# Patient Record
Sex: FEMALE | Race: WHITE | Employment: OTHER | URBAN - METROPOLITAN AREA
[De-identification: names, ages, dates, MRNs, and addresses within clinical notes are randomized per-mention and may not be internally consistent; named-entity substitution may affect disease eponyms.]

---

## 2017-01-31 ENCOUNTER — TRANSFERRED RECORDS (OUTPATIENT)
Dept: HEALTH INFORMATION MANAGEMENT | Facility: CLINIC | Age: 67
End: 2017-01-31

## 2017-02-03 DIAGNOSIS — K90.9 MALABSORPTION: ICD-10-CM

## 2017-02-03 DIAGNOSIS — E61.1 IRON DEFICIENCY: Primary | ICD-10-CM

## 2017-02-06 ENCOUNTER — INFUSION THERAPY VISIT (OUTPATIENT)
Dept: INFUSION THERAPY | Facility: CLINIC | Age: 67
End: 2017-02-06
Attending: INTERNAL MEDICINE
Payer: MEDICARE

## 2017-02-06 VITALS
RESPIRATION RATE: 16 BRPM | HEART RATE: 69 BPM | SYSTOLIC BLOOD PRESSURE: 125 MMHG | DIASTOLIC BLOOD PRESSURE: 67 MMHG | TEMPERATURE: 98.8 F

## 2017-02-06 DIAGNOSIS — E61.1 IRON DEFICIENCY: ICD-10-CM

## 2017-02-06 DIAGNOSIS — D50.0 IRON DEFICIENCY ANEMIA SECONDARY TO BLOOD LOSS (CHRONIC): Primary | ICD-10-CM

## 2017-02-06 DIAGNOSIS — K90.9 MALABSORPTION: ICD-10-CM

## 2017-02-06 PROCEDURE — 96365 THER/PROPH/DIAG IV INF INIT: CPT

## 2017-02-06 PROCEDURE — 25000128 H RX IP 250 OP 636: Performed by: INTERNAL MEDICINE

## 2017-02-06 RX ADMIN — FERRIC CARBOXYMALTOSE INJECTION 750 MG: 50 INJECTION, SOLUTION INTRAVENOUS at 09:51

## 2017-02-06 RX ADMIN — SODIUM CHLORIDE 250 ML: 9 INJECTION, SOLUTION INTRAVENOUS at 09:51

## 2017-02-06 NOTE — PROGRESS NOTES
Infusion Nursing Note:  Jacklyn RATLIFF Flako presents today for Injectafer.    Patient seen by provider today: No.   present during visit today: Not Applicable.    Note: N/A.    Intravenous Access:  Peripheral IV placed.    Treatment Conditions:  Not Applicable.      Post Infusion Assessment:  Patient tolerated infusion without incident.  Patient observed for 30 minutes post Injectafer per protocol.  Blood return noted pre and post infusion.  Site patent and intact, free from redness, edema or discomfort.  No evidence of extravasations.  Access discontinued per protocol.    Discharge Plan:   Discharge instructions reviewed with: Patient.  Patient and/or family verbalized understanding of discharge instructions and all questions answered.  Copy of AVS reviewed with patient and/or family.  Patient will return 2/14/17 for next appointment.  Patient discharged in stable condition accompanied by: self.  Departure Mode: Ambulatory.    Venkat De RN

## 2017-02-06 NOTE — MR AVS SNAPSHOT
"              After Visit Summary   2/6/2017    Jacklyn Arriola    MRN: [id number]           Patient Information     Date Of Birth          1950        Visit Information        Provider Department      2/6/2017 9:30 AM NORTH CHAIR 10 [facility]        Today's Diagnoses     Iron deficiency anemia secondary to blood loss (chronic)    -  1     Iron deficiency         Malabsorption            Follow-ups after your visit        Your next 10 appointments already scheduled     Feb 14, 2017 10:30 AM   Level 2 with NORTH CHAIR 4   [facility] ([facility])    [facility]  [address]  [facility] [id number] [phone]              Who to contact     If you have questions or need follow up information about today's clinic visit or your schedule please contact [facility] AND [facility] directly at [phone].  Normal or non-critical lab and imaging results will be communicated to you by Prismic Pharmaceuticalshart, letter or phone within 4 business days after the clinic has received the results. If you do not hear from us within 7 days, please contact the clinic through Pr[employer] Pharm[employer]shart or phone. If you have a critical or abnormal lab result, we will notify you by phone as soon as possible.  Submit refill requests through Moodswiing or call your pharmacy and they will forward the refill request to us. Please allow 3 business days for your refill to be completed.          Additional Information About Your Visit        MyChart Information     Moodswiing lets you send messages to your doctor, view your test results, renew your prescriptions, schedule appointments and more. To sign up, go to [email or web address] . Click on \"Log in\" on the left side of the screen, which will take you to the Welcome page. Then click on \"Sign up Now\" on the right side of the page.     You will be asked to enter the access code listed " below, as well as some personal information. Please follow the directions to create your username and password.     Your access code is: [email or web address]  Expires: 2017 10:55 AM     Your access code will  in 90 days. If you need help or a new code, please call your [facility] or [phone].        Care EveryWhere ID     This is your Care EveryWhere ID. This could be used by other organizations to access your [place] medical records  [id number]        Your Vitals Were     Pulse Temperature Respirations             69 98.8  F (37.1  C) (Oral) 16          Blood Pressure from Last 3 Encounters:   17 125/67   10/06/16 118/65   16 121/76    Weight from Last 3 Encounters:   16 88.451 kg (195 lb)   16 88.451 kg (195 lb)              We Performed the Following     Treatment Conditions        Primary Care Provider    None Specified       No primary provider on file.        Thank you!     Thank you for choosing [facility] AND [facility]  for your care. Our goal is always to provide you with excellent care. Hearing back from our patients is one way we can continue to improve our services. Please take a few minutes to complete the written survey that you may receive in the mail after your visit with us. Thank you!             Your Updated Medication List - Protect others around you: Learn how to safely use, store and throw away your medicines at www.disposemymeds.org.          This list is accurate as of: 17 10:55 AM.  Always use your most recent med list.                   Brand Name Dispense Instructions for use    adalimumab 40 MG/0.8ML prefilled syringe kit    HUMIRA     Inject 40 mg Subcutaneous every 14 days       MERCAPTOPURINE PO      Take 50 mg by mouth daily       polyethylene glycol powder    MIRALAX/GLYCOLAX     Take 1 capful by mouth as needed for constipation       PRINZIDE 20-12.5 MG per tablet   Generic drug:  lisinopril-hydrochlorothiazide      2  tablets daily       TOPROL  MG 24 hr tablet   Generic drug:  metoprolol      1 TABLET DAILY

## 2017-02-14 ENCOUNTER — INFUSION THERAPY VISIT (OUTPATIENT)
Dept: INFUSION THERAPY | Facility: CLINIC | Age: 67
End: 2017-02-14
Attending: INTERNAL MEDICINE
Payer: MEDICARE

## 2017-02-14 VITALS
DIASTOLIC BLOOD PRESSURE: 60 MMHG | TEMPERATURE: 97.9 F | RESPIRATION RATE: 16 BRPM | HEART RATE: 66 BPM | SYSTOLIC BLOOD PRESSURE: 112 MMHG

## 2017-02-14 DIAGNOSIS — D50.0 IRON DEFICIENCY ANEMIA SECONDARY TO BLOOD LOSS (CHRONIC): ICD-10-CM

## 2017-02-14 DIAGNOSIS — K90.9 MALABSORPTION: ICD-10-CM

## 2017-02-14 DIAGNOSIS — E61.1 IRON DEFICIENCY: Primary | ICD-10-CM

## 2017-02-14 PROCEDURE — 96361 HYDRATE IV INFUSION ADD-ON: CPT

## 2017-02-14 PROCEDURE — 96365 THER/PROPH/DIAG IV INF INIT: CPT

## 2017-02-14 PROCEDURE — 25000128 H RX IP 250 OP 636: Performed by: INTERNAL MEDICINE

## 2017-02-14 RX ADMIN — FERRIC CARBOXYMALTOSE INJECTION 750 MG: 50 INJECTION, SOLUTION INTRAVENOUS at 10:38

## 2017-02-14 RX ADMIN — SODIUM CHLORIDE 250 ML: 9 INJECTION, SOLUTION INTRAVENOUS at 10:37

## 2017-02-14 NOTE — PROGRESS NOTES
Infusion Nursing Note:  Jacklyn RATLIFF Flako presents today for injectafer #2.    Patient seen by provider today: No   present during visit today: Not Applicable.    Note: N/A.    Intravenous Access:  Peripheral IV placed.    Treatment Conditions:  Not Applicable.      Post Infusion Assessment:  Patient tolerated infusion without incident.  Patient observed for 30 minutes post injectafer per protocol.  Site patent and intact, free from redness, edema or discomfort.  No evidence of extravasations.  Access discontinued per protocol.    Discharge Plan:   AVS to patient via MYCHART.  Patient will return prn for next appointment.   Patient discharged in stable condition accompanied by: self.  Departure Mode: Ambulatory.    Carlos Fulton RN

## 2017-02-14 NOTE — MR AVS SNAPSHOT
"              After Visit Summary   2017    Jacklyn Arriola    MRN: [id number]           Patient Information     Date Of Birth          1950        Visit Information        Provider Department      2017 10:30 AM NORTH CHAIR 4 [facility]        Today's Diagnoses     Iron deficiency    -  1    Malabsorption        Iron deficiency anemia secondary to blood loss (chronic)           Follow-ups after your visit        Who to contact     If you have questions or need follow up information about today's clinic visit or your schedule please contact [facility] AND [facility] directly at [phone].  Normal or non-critical lab and imaging results will be communicated to you by Micronoteshart, letter or phone within 4 business days after the clinic has received the results. If you do not hear from us within 7 days, please contact the clinic through Ver[employer] or phone. If you have a critical or abnormal lab result, we will notify you by phone as soon as possible.  Submit refill requests through [employer] or call your pharmacy and they will forward the refill request to us. Please allow 3 business days for your refill to be completed.          Additional Information About Your Visit        MyChart Information     [employer] lets you send messages to your doctor, view your test results, renew your prescriptions, schedule appointments and more. To sign up, go to [email or web address] . Click on \"Log in\" on the left side of the screen, which will take you to the Welcome page. Then click on \"Sign up Now\" on the right side of the page.     You will be asked to enter the access code listed below, as well as some personal information. Please follow the directions to create your username and password.     Your access code is: [email or web address]  Expires: 2017 10:55 AM     Your access code will  in 90 days. If you need help or a new code, please call your [place] clinic or " [phone].        Care EveryWhere ID     This is your Care EveryWhere ID. This could be used by other organizations to access your [place] medical records  [id number]        Your Vitals Were     Pulse Temperature Respirations             66 97.9  F (36.6  C) (Oral) 16          Blood Pressure from Last 3 Encounters:   02/14/17 112/60   02/06/17 125/67   10/06/16 118/65    Weight from Last 3 Encounters:   09/27/16 88.5 kg (195 lb)   04/11/16 88.5 kg (195 lb)              We Performed the Following     Treatment Conditions        Primary Care Provider    None Specified       No primary provider on file.        Thank you!     Thank you for choosing [facility] AND [facility]  for your care. Our goal is always to provide you with excellent care. Hearing back from our patients is one way we can continue to improve our services. Please take a few minutes to complete the written survey that you may receive in the mail after your visit with us. Thank you!             Your Updated Medication List - Protect others around you: Learn how to safely use, store and throw away your medicines at www.disposemymeds.org.          This list is accurate as of: 2/14/17 11:33 AM.  Always use your most recent med list.                   Brand Name Dispense Instructions for use    adalimumab 40 MG/0.8ML prefilled syringe kit    HUMIRA     Inject 40 mg Subcutaneous every 14 days       MERCAPTOPURINE PO      Take 50 mg by mouth daily       polyethylene glycol powder    MIRALAX/GLYCOLAX     Take 1 capful by mouth as needed for constipation       PRINZIDE 20-12.5 MG per tablet   Generic drug:  lisinopril-hydrochlorothiazide      2 tablets daily       TOPROL  MG 24 hr tablet   Generic drug:  metoprolol      1 TABLET DAILY

## 2017-04-03 DIAGNOSIS — D50.9 IRON DEFICIENCY ANEMIA, UNSPECIFIED: Primary | ICD-10-CM

## 2017-04-06 ENCOUNTER — INFUSION THERAPY VISIT (OUTPATIENT)
Dept: INFUSION THERAPY | Facility: CLINIC | Age: 67
End: 2017-04-06
Attending: INTERNAL MEDICINE
Payer: MEDICARE

## 2017-04-06 VITALS
TEMPERATURE: 98.1 F | RESPIRATION RATE: 18 BRPM | HEART RATE: 78 BPM | SYSTOLIC BLOOD PRESSURE: 149 MMHG | DIASTOLIC BLOOD PRESSURE: 70 MMHG

## 2017-04-06 DIAGNOSIS — D50.9 IRON DEFICIENCY ANEMIA, UNSPECIFIED: ICD-10-CM

## 2017-04-06 DIAGNOSIS — K50.00 CROHN'S DISEASE OF SMALL INTESTINE WITHOUT COMPLICATION (H): ICD-10-CM

## 2017-04-06 DIAGNOSIS — K90.9 MALABSORPTION: Primary | ICD-10-CM

## 2017-04-06 PROCEDURE — 96361 HYDRATE IV INFUSION ADD-ON: CPT

## 2017-04-06 PROCEDURE — 96374 THER/PROPH/DIAG INJ IV PUSH: CPT

## 2017-04-06 PROCEDURE — 25000128 H RX IP 250 OP 636: Performed by: INTERNAL MEDICINE

## 2017-04-06 RX ADMIN — SODIUM CHLORIDE 250 ML: 9 INJECTION, SOLUTION INTRAVENOUS at 09:12

## 2017-04-06 RX ADMIN — FERRIC CARBOXYMALTOSE INJECTION 750 MG: 50 INJECTION, SOLUTION INTRAVENOUS at 09:11

## 2017-04-06 ASSESSMENT — PAIN SCALES - GENERAL: PAINLEVEL: NO PAIN (0)

## 2017-04-06 NOTE — MR AVS SNAPSHOT
"              After Visit Summary   4/6/2017    Jacklyn Arriola    MRN: [id number]           Patient Information     Date Of Birth          1950        Visit Information        Provider Department      4/6/2017 9:00 AM  INFUSION CHAIR 12 [facility]        Today's Diagnoses     Malabsorption    -  1    Iron deficiency anemia, unspecified        Crohn's disease of small intestine without complication (H)           Follow-ups after your visit        Your next 10 appointments already scheduled     Apr 14, 2017  9:00 AM CDT   Level 2 with  INFUSION CHAIR 6   [facility] ([facility])    [facility] [place]  [address]  [facility] [id number] [phone]              Who to contact     If you have questions or need follow up information about today's clinic visit or your schedule please contact [facility] AND [facility] directly at [phone].  Normal or non-critical lab and imaging results will be communicated to you by INMANhart, letter or phone within 4 business days after the clinic has received the results. If you do not hear from us within 7 days, please contact the clinic through INMANhart or phone. If you have a critical or abnormal lab result, we will notify you by phone as soon as possible.  Submit refill requests through Skylines or call your pharmacy and they will forward the refill request to us. Please allow 3 business days for your refill to be completed.          Additional Information About Your Visit        INMANhart Information     Skylines lets you send messages to your doctor, view your test results, renew your prescriptions, schedule appointments and more. To sign up, go to [email or web address] . Click on \"Log in\" on the left side of the screen, which will take you to the Welcome page. Then click on \"Sign up Now\" on the right side of the page.     You will be asked " to enter the access code listed below, as well as some personal information. Please follow the directions to create your username and password.     Your access code is: [email or web address]  Expires: 2017 11:55 AM     Your access code will  in 90 days. If you need help or a new code, please call your [place] clinic or [phone].        Care EveryWhere ID     This is your Care EveryWhere ID. This could be used by other organizations to access your [place] medical records  [id number]        Your Vitals Were     Pulse Temperature Respirations             78 98.1  F (36.7  C) (Oral) 18          Blood Pressure from Last 3 Encounters:   17 149/70   17 112/60   17 125/67    Weight from Last 3 Encounters:   16 88.5 kg (195 lb)   16 88.5 kg (195 lb)              We Performed the Following     Treatment Conditions        Primary Care Provider    None Specified       No primary provider on file.        Thank you!     Thank you for choosing [facility]  for your care. Our goal is always to provide you with excellent care. Hearing back from our patients is one way we can continue to improve our services. Please take a few minutes to complete the written survey that you may receive in the mail after your visit with us. Thank you!             Your Updated Medication List - Protect others around you: Learn how to safely use, store and throw away your medicines at www.disposemymeds.org.          This list is accurate as of: 17 10:13 AM.  Always use your most recent med list.                   Brand Name Dispense Instructions for use    adalimumab 40 MG/0.8ML prefilled syringe kit    HUMIRA     Inject 40 mg Subcutaneous every 14 days       MERCAPTOPURINE PO      Take 50 mg by mouth daily       polyethylene glycol powder    MIRALAX/GLYCOLAX     Take 1 capful by mouth as needed for constipation       PRINZIDE 20-12.5 MG per tablet   Generic drug:   lisinopril-hydrochlorothiazide      2 tablets daily       TOPROL  MG 24 hr tablet   Generic drug:  metoprolol      1 TABLET DAILY

## 2017-04-06 NOTE — PROGRESS NOTES
Infusion Nursing Note:  Jacklyn GAUDENCIO Arriola presents today for Injectafer.    Patient seen by provider today: No   present during visit today: Not Applicable.    Note: N/A.    Intravenous Access:  Peripheral IV placed.    Treatment Conditions:  Not Applicable.      Post Infusion Assessment:  Patient tolerated infusion without incident.  Patient observed for 30 minutes post Injectafer per protocol.  Site patent and intact, free from redness, edema or discomfort.  No evidence of extravasations.  Access discontinued per protocol.    Discharge Plan:   Copy of AVS reviewed with patient and/or family.  Patient will return in 7 days for next appointment.  Patient discharged in stable condition accompanied by: self.  Departure Mode: Ambulatory.    Jaylene Hong RN

## 2017-04-17 ENCOUNTER — INFUSION THERAPY VISIT (OUTPATIENT)
Dept: INFUSION THERAPY | Facility: CLINIC | Age: 67
End: 2017-04-17
Attending: INTERNAL MEDICINE
Payer: MEDICARE

## 2017-04-17 VITALS
RESPIRATION RATE: 16 BRPM | TEMPERATURE: 97.6 F | DIASTOLIC BLOOD PRESSURE: 60 MMHG | SYSTOLIC BLOOD PRESSURE: 121 MMHG | HEART RATE: 67 BPM

## 2017-04-17 DIAGNOSIS — K50.00 CROHN'S DISEASE OF SMALL INTESTINE WITHOUT COMPLICATION (H): ICD-10-CM

## 2017-04-17 DIAGNOSIS — D50.9 IRON DEFICIENCY ANEMIA, UNSPECIFIED: Primary | ICD-10-CM

## 2017-04-17 PROCEDURE — 96365 THER/PROPH/DIAG IV INF INIT: CPT

## 2017-04-17 PROCEDURE — 25000128 H RX IP 250 OP 636: Performed by: INTERNAL MEDICINE

## 2017-04-17 RX ADMIN — FERRIC CARBOXYMALTOSE INJECTION 750 MG: 50 INJECTION, SOLUTION INTRAVENOUS at 13:40

## 2017-04-17 RX ADMIN — SODIUM CHLORIDE 250 ML: 9 INJECTION, SOLUTION INTRAVENOUS at 13:39

## 2017-04-17 ASSESSMENT — PAIN SCALES - GENERAL: PAINLEVEL: NO PAIN (0)

## 2017-04-17 NOTE — MR AVS SNAPSHOT
"              After Visit Summary   2017    Jacklyn Arriola    MRN: [id number]           Patient Information     Date Of Birth          1950        Visit Information        Provider Department      2017 1:30 PM  INFUSION CHAIR 7 [facility]        Today's Diagnoses     Iron deficiency anemia, unspecified    -  1    Crohn's disease of small intestine without complication (H)           Follow-ups after your visit        Who to contact     If you have questions or need follow up information about today's clinic visit or your schedule please contact [facility] directly at [phone].  Normal or non-critical lab and imaging results will be communicated to you by Xplornethart, letter or phone within 4 business days after the clinic has received the results. If you do not hear from us within 7 days, please contact the clinic through Xplornethart or phone. If you have a critical or abnormal lab result, we will notify you by phone as soon as possible.  Submit refill requests through Open-Plug or call your pharmacy and they will forward the refill request to us. Please allow 3 business days for your refill to be completed.          Additional Information About Your Visit        MyChart Information     Open-Plug lets you send messages to your doctor, view your test results, renew your prescriptions, schedule appointments and more. To sign up, go to [email or web address] . Click on \"Log in\" on the left side of the screen, which will take you to the Welcome page. Then click on \"Sign up Now\" on the right side of the page.     You will be asked to enter the access code listed below, as well as some personal information. Please follow the directions to create your username and password.     Your access code is: [email or web address]  Expires: 2017 11:55 AM     Your access code will  in 90 days. If you need help or a new code, please call your [place] " clinic or [phone].        Care EveryWhere ID     This is your Care EveryWhere ID. This could be used by other organizations to access your [place] medical records  [id number]        Your Vitals Were     Pulse Temperature Respirations             67 97.6  F (36.4  C) (Oral) 16          Blood Pressure from Last 3 Encounters:   04/17/17 121/60   04/06/17 149/70   02/14/17 112/60    Weight from Last 3 Encounters:   09/27/16 88.5 kg (195 lb)   04/11/16 88.5 kg (195 lb)              We Performed the Following     Treatment Conditions        Primary Care Provider    None Specified       No primary provider on file.        Thank you!     Thank you for choosing [facility]  for your care. Our goal is always to provide you with excellent care. Hearing back from our patients is one way we can continue to improve our services. Please take a few minutes to complete the written survey that you may receive in the mail after your visit with us. Thank you!             Your Updated Medication List - Protect others around you: Learn how to safely use, store and throw away your medicines at www.disposemymeds.org.          This list is accurate as of: 4/17/17  2:32 PM.  Always use your most recent med list.                   Brand Name Dispense Instructions for use    adalimumab 40 MG/0.8ML prefilled syringe kit    HUMIRA     Inject 40 mg Subcutaneous every 14 days       MERCAPTOPURINE PO      Take 50 mg by mouth daily       polyethylene glycol powder    MIRALAX/GLYCOLAX     Take 1 capful by mouth as needed for constipation       PRINZIDE 20-12.5 MG per tablet   Generic drug:  lisinopril-hydrochlorothiazide      2 tablets daily       TOPROL  MG 24 hr tablet   Generic drug:  metoprolol      1 TABLET DAILY

## 2017-04-17 NOTE — PROGRESS NOTES
Infusion Nursing Note:  Jacklyn RATLIFF Flako presents today for injectafer.    Patient seen by provider today: No   present during visit today: Not Applicable.    Note: N/A.    Intravenous Access:  Peripheral IV placed.    Treatment Conditions:  Not Applicable.      Post Infusion Assessment:  Patient tolerated infusion without incident.  Patient observed for 30 minutes post injectafer per protocol.  Blood return noted pre and post infusion.  Site patent and intact, free from redness, edema or discomfort.  No evidence of extravasations.  Access discontinued per protocol.    Discharge Plan:   Discharge instructions reviewed with: Patient.  Patient and/or family verbalized understanding of discharge instructions and all questions answered.  Copy of AVS reviewed with patient and/or family.  Patient will return as needed for next appointment.  Patient discharged in stable condition accompanied by: self.  Departure Mode: Ambulatory.    Stefani Clemente RN

## 2017-06-13 ENCOUNTER — TRANSFERRED RECORDS (OUTPATIENT)
Dept: HEALTH INFORMATION MANAGEMENT | Facility: CLINIC | Age: 67
End: 2017-06-13

## 2017-06-19 ENCOUNTER — INFUSION THERAPY VISIT (OUTPATIENT)
Dept: INFUSION THERAPY | Facility: CLINIC | Age: 67
End: 2017-06-19
Attending: INTERNAL MEDICINE
Payer: MEDICARE

## 2017-06-19 VITALS
SYSTOLIC BLOOD PRESSURE: 111 MMHG | HEART RATE: 74 BPM | TEMPERATURE: 98.1 F | DIASTOLIC BLOOD PRESSURE: 66 MMHG | RESPIRATION RATE: 16 BRPM

## 2017-06-19 DIAGNOSIS — K50.019 CROHN'S DISEASE OF SMALL INTESTINE WITH COMPLICATION (H): Primary | ICD-10-CM

## 2017-06-19 DIAGNOSIS — K50.019 CROHN'S DISEASE OF SMALL INTESTINE WITH COMPLICATION (H): ICD-10-CM

## 2017-06-19 DIAGNOSIS — D50.9 IRON DEFICIENCY ANEMIA, UNSPECIFIED: ICD-10-CM

## 2017-06-19 DIAGNOSIS — E61.1 IRON DEFICIENCY: ICD-10-CM

## 2017-06-19 DIAGNOSIS — K50.00 CROHN'S DISEASE OF SMALL INTESTINE WITHOUT COMPLICATION (H): ICD-10-CM

## 2017-06-19 PROCEDURE — 96365 THER/PROPH/DIAG IV INF INIT: CPT

## 2017-06-19 PROCEDURE — 25000128 H RX IP 250 OP 636: Performed by: INTERNAL MEDICINE

## 2017-06-19 RX ADMIN — SODIUM CHLORIDE 250 ML: 9 INJECTION, SOLUTION INTRAVENOUS at 14:40

## 2017-06-19 RX ADMIN — FERRIC CARBOXYMALTOSE INJECTION 750 MG: 50 INJECTION, SOLUTION INTRAVENOUS at 14:41

## 2017-06-19 ASSESSMENT — PAIN SCALES - GENERAL: PAINLEVEL: NO PAIN (0)

## 2017-06-19 NOTE — MR AVS SNAPSHOT
"              After Visit Summary   2017    Jacklyn Arriola    MRN: [id number]           Patient Information     Date Of Birth          1950        Visit Information        Provider Department      2017 2:30 PM  INFUSION CHAIR 11 [facility]        Today's Diagnoses     Crohn's disease of small intestine with complication (H)    -  1    Iron deficiency anemia, unspecified        Crohn's disease of small intestine without complication (H)           Follow-ups after your visit        Who to contact     If you have questions or need follow up information about today's clinic visit or your schedule please contact [facility] directly at [phone].  Normal or non-critical lab and imaging results will be communicated to you by immatics biotechnologieshart, letter or phone within 4 business days after the clinic has received the results. If you do not hear from us within 7 days, please contact the clinic through immatics biotechnologieshart or phone. If you have a critical or abnormal lab result, we will notify you by phone as soon as possible.  Submit refill requests through PlusFourSix or call your pharmacy and they will forward the refill request to us. Please allow 3 business days for your refill to be completed.          Additional Information About Your Visit        MyChart Information     PlusFourSix lets you send messages to your doctor, view your test results, renew your prescriptions, schedule appointments and more. To sign up, go to [email or web address] . Click on \"Log in\" on the left side of the screen, which will take you to the Welcome page. Then click on \"Sign up Now\" on the right side of the page.     You will be asked to enter the access code listed below, as well as some personal information. Please follow the directions to create your username and password.     Your access code is: [email or web address]  Expires: 2017  3:38 PM     Your access code will  in 90 " days. If you need help or a new code, please call your [place] clinic or [phone].        Care EveryWhere ID     This is your Care EveryWhere ID. This could be used by other organizations to access your [place] medical records  [id number]        Your Vitals Were     Pulse Temperature Respirations             74 98.1  F (36.7  C) (Oral) 16          Blood Pressure from Last 3 Encounters:   06/19/17 111/66   04/17/17 121/60   04/06/17 149/70    Weight from Last 3 Encounters:   09/27/16 88.5 kg (195 lb)   04/11/16 88.5 kg (195 lb)              Today, you had the following     No orders found for display       Primary Care Provider    None Specified       No primary provider on file.        Thank you!     Thank you for choosing [facility] AND [facility]  for your care. Our goal is always to provide you with excellent care. Hearing back from our patients is one way we can continue to improve our services. Please take a few minutes to complete the written survey that you may receive in the mail after your visit with us. Thank you!             Your Updated Medication List - Protect others around you: Learn how to safely use, store and throw away your medicines at www.disposemymeds.org.          This list is accurate as of: 6/19/17  3:38 PM.  Always use your most recent med list.                   Brand Name Dispense Instructions for use    adalimumab 40 MG/0.8ML prefilled syringe kit    HUMIRA     Inject 40 mg Subcutaneous every 7 days       MERCAPTOPURINE PO      Take 50 mg by mouth daily       polyethylene glycol powder    MIRALAX/GLYCOLAX     Take 1 capful by mouth as needed for constipation       PRINZIDE 20-12.5 MG per tablet   Generic drug:  lisinopril-hydrochlorothiazide      2 tablets daily       TOPROL  MG 24 hr tablet   Generic drug:  metoprolol      1 TABLET DAILY

## 2017-06-19 NOTE — PROGRESS NOTES
Infusion Nursing Note:  Jacklyn Arriola presents today for injectafer.    Patient seen by provider today: No   present during visit today: Not Applicable.    Note: Patient has had injectafer in the past and tolerated well. Reports ongoing fatigue and lightheadedness when changing positions, otherwise no new concerns.    Intravenous Access:  Peripheral IV placed.    Treatment Conditions:  Not Applicable.      Post Infusion Assessment:  Patient tolerated infusion without incident.  Patient observed for 30 minutes post injectafer per protocol.  Blood return noted pre and post infusion.  Site patent and intact, free from redness, edema or discomfort.  No evidence of extravasations.  Access discontinued per protocol.    Discharge Plan:   Discharge instructions reviewed with: Patient.  Patient and/or family verbalized understanding of discharge instructions and all questions answered.  AVS to patient via ITS ComplianceT.  Patient will return in 1 week for next appointment--will stop at  to schedule.   Patient discharged in stable condition accompanied by: self.  Departure Mode: Ambulatory.    Stefani Clemente RN

## 2017-06-27 ENCOUNTER — INFUSION THERAPY VISIT (OUTPATIENT)
Dept: INFUSION THERAPY | Facility: CLINIC | Age: 67
End: 2017-06-27
Attending: INTERNAL MEDICINE
Payer: MEDICARE

## 2017-06-27 VITALS
DIASTOLIC BLOOD PRESSURE: 76 MMHG | SYSTOLIC BLOOD PRESSURE: 115 MMHG | TEMPERATURE: 97.7 F | HEART RATE: 69 BPM | RESPIRATION RATE: 16 BRPM

## 2017-06-27 DIAGNOSIS — K50.019 CROHN'S DISEASE OF SMALL INTESTINE WITH COMPLICATION (H): Primary | ICD-10-CM

## 2017-06-27 DIAGNOSIS — D50.9 IRON DEFICIENCY ANEMIA, UNSPECIFIED: ICD-10-CM

## 2017-06-27 DIAGNOSIS — K90.9 MALABSORPTION: ICD-10-CM

## 2017-06-27 DIAGNOSIS — K50.00 CROHN'S DISEASE OF SMALL INTESTINE WITHOUT COMPLICATION (H): ICD-10-CM

## 2017-06-27 PROCEDURE — 25000128 H RX IP 250 OP 636: Performed by: INTERNAL MEDICINE

## 2017-06-27 PROCEDURE — 96365 THER/PROPH/DIAG IV INF INIT: CPT

## 2017-06-27 RX ADMIN — FERRIC CARBOXYMALTOSE INJECTION 750 MG: 50 INJECTION, SOLUTION INTRAVENOUS at 10:12

## 2017-06-27 RX ADMIN — SODIUM CHLORIDE 250 ML: 9 INJECTION, SOLUTION INTRAVENOUS at 10:12

## 2017-06-27 ASSESSMENT — PAIN SCALES - GENERAL: PAINLEVEL: NO PAIN (0)

## 2017-06-27 NOTE — MR AVS SNAPSHOT
"              After Visit Summary   6/27/2017    Jacklyn Arriola    MRN: [id number]           Patient Information     Date Of Birth          1950        Visit Information        Provider Department      6/27/2017 10:00 AM  INFUSION CHAIR 18 [facility]        Today's Diagnoses     Crohn's disease of small intestine with complication (H)    -  1    Malabsorption        Iron deficiency anemia, unspecified        Crohn's disease of small intestine without complication (H)           Follow-ups after your visit        Who to contact     If you have questions or need follow up information about today's clinic visit or your schedule please contact [facility] AND [facility] directly at [phone].  Normal or non-critical lab and imaging results will be communicated to you by PassionTaghart, letter or phone within 4 business days after the clinic has received the results. If you do not hear from us within 7 days, please contact the clinic through PassionTaghart or phone. If you have a critical or abnormal lab result, we will notify you by phone as soon as possible.  Submit refill requests through Operative Mind or call your pharmacy and they will forward the refill request to us. Please allow 3 business days for your refill to be completed.          Additional Information About Your Visit        MyChart Information     Operative Mind lets you send messages to your doctor, view your test results, renew your prescriptions, schedule appointments and more. To sign up, go to [email or web address] . Click on \"Log in\" on the left side of the screen, which will take you to the Welcome page. Then click on \"Sign up Now\" on the right side of the page.     You will be asked to enter the access code listed below, as well as some personal information. Please follow the directions to create your username and password.     Your access code is: [email or web address]  Expires: 9/17/2017  3:38 PM     Your access " code will  in 90 days. If you need help or a new code, please call your [facility] clinic or [phone].        Care EveryWhere ID     This is your Care EveryWhere ID. This could be used by other organizations to access your [facility] medical records  [id number]        Your Vitals Were     Pulse Temperature Respirations Breastfeeding?          69 97.7  F (36.5  C) (Oral) 16 No         Blood Pressure from Last 3 Encounters:   17 115/76   17 111/66   17 121/60    Weight from Last 3 Encounters:   16 88.5 kg (195 lb)   16 88.5 kg (195 lb)              Today, you had the following     No orders found for display       Primary Care Provider    None Specified       No primary provider on file.        Equal Access to Services     GAYE VALERO : Shy Kraft, wafiona luqadaha, qaybta kaalmasam law, marjorie wu . So [facility] [phone].    ATENCIÓN: Si habla español, tiene a foster disposición servicios gratuitos de asistencia lingüística. Llame al [phone].    We comply with applicable federal civil rights laws and [place] laws. We do not discriminate on the basis of race, color, national origin, age, disability sex, sexual orientation or gender identity.            Thank you!     Thank you for choosing [facility] AND [facility]  for your care. Our goal is always to provide you with excellent care. Hearing back from our patients is one way we can continue to improve our services. Please take a few minutes to complete the written survey that you may receive in the mail after your visit with us. Thank you!             Your Updated Medication List - Protect others around you: Learn how to safely use, store and throw away your medicines at www.disposemymeds.org.          This list is accurate as of: 17  2:20 PM.  Always use your most recent med list.                   Brand Name Dispense Instructions for use Diagnosis     adalimumab 40 MG/0.8ML prefilled syringe kit    HUMIRA     Inject 40 mg Subcutaneous every 7 days        MERCAPTOPURINE PO      Take 50 mg by mouth daily        polyethylene glycol powder    MIRALAX/GLYCOLAX     Take 1 capful by mouth as needed for constipation        PRINZIDE 20-12.5 MG per tablet   Generic drug:  lisinopril-hydrochlorothiazide      2 tablets daily        TOPROL  MG 24 hr tablet   Generic drug:  metoprolol      1 TABLET DAILY

## 2017-06-27 NOTE — PROGRESS NOTES
Infusion Nursing Note:  Jacklyn RATLIFF Flako presents today for injectafer.    Patient seen by provider today: No   present during visit today: Not Applicable.    Note: Seeing Dr. Juarez 7/6 for hematology workup as Crohns disease is in remission and is not causing her decreased iron levels, per patient.    Intravenous Access:  Peripheral IV placed.    Treatment Conditions:  Not Applicable.      Post Infusion Assessment:  Patient tolerated infusion without incident.  Patient observed for 30 minutes post injectafer per protocol.  Site patent and intact, free from redness, edema or discomfort.  No evidence of extravasations.  Access discontinued per protocol.    Discharge Plan:   AVS to patient via MYCHART.  Patient will return prn for next appointment.   Patient discharged in stable condition accompanied by: self.  Departure Mode: Ambulatory.    Carlos Fulton RN

## 2018-03-07 ENCOUNTER — HOSPITAL ENCOUNTER (EMERGENCY)
Facility: CLINIC | Age: 68
Discharge: HOME | End: 2018-03-08
Attending: EMERGENCY MEDICINE | Admitting: EMERGENCY MEDICINE
Payer: MEDICARE

## 2018-03-07 VITALS
RESPIRATION RATE: 18 BRPM | OXYGEN SATURATION: 97 % | TEMPERATURE: 98 F | DIASTOLIC BLOOD PRESSURE: 77 MMHG | HEIGHT: 65 IN | BODY MASS INDEX: 34.16 KG/M2 | WEIGHT: 205 LBS | SYSTOLIC BLOOD PRESSURE: 131 MMHG

## 2018-03-07 DIAGNOSIS — G43.909 MIGRAINE WITHOUT STATUS MIGRAINOSUS, NOT INTRACTABLE, UNSPECIFIED MIGRAINE TYPE: ICD-10-CM

## 2018-03-07 PROCEDURE — 96376 TX/PRO/DX INJ SAME DRUG ADON: CPT

## 2018-03-07 PROCEDURE — 25000128 H RX IP 250 OP 636: Performed by: EMERGENCY MEDICINE

## 2018-03-07 PROCEDURE — 96374 THER/PROPH/DIAG INJ IV PUSH: CPT

## 2018-03-07 PROCEDURE — 96375 TX/PRO/DX INJ NEW DRUG ADDON: CPT

## 2018-03-07 PROCEDURE — 96361 HYDRATE IV INFUSION ADD-ON: CPT

## 2018-03-07 PROCEDURE — 99285 EMERGENCY DEPT VISIT HI MDM: CPT | Mod: 25

## 2018-03-07 PROCEDURE — 25000128 H RX IP 250 OP 636

## 2018-03-07 RX ORDER — LORAZEPAM 2 MG/ML
INJECTION INTRAMUSCULAR
Status: DISCONTINUED
Start: 2018-03-07 | End: 2018-03-08 | Stop reason: HOSPADM

## 2018-03-07 RX ORDER — KETOROLAC TROMETHAMINE 15 MG/ML
15 INJECTION, SOLUTION INTRAMUSCULAR; INTRAVENOUS ONCE
Status: COMPLETED | OUTPATIENT
Start: 2018-03-07 | End: 2018-03-07

## 2018-03-07 RX ORDER — BUTALBITAL, ACETAMINOPHEN AND CAFFEINE 50; 325; 40 MG/1; MG/1; MG/1
1 TABLET ORAL EVERY 4 HOURS PRN
Qty: 28 TABLET | Refills: 0 | Status: ON HOLD | OUTPATIENT
Start: 2018-03-07 | End: 2021-05-26

## 2018-03-07 RX ORDER — DIPHENHYDRAMINE HYDROCHLORIDE 50 MG/ML
25 INJECTION INTRAMUSCULAR; INTRAVENOUS ONCE
Status: COMPLETED | OUTPATIENT
Start: 2018-03-07 | End: 2018-03-07

## 2018-03-07 RX ORDER — DIPHENHYDRAMINE HYDROCHLORIDE 50 MG/ML
INJECTION INTRAMUSCULAR; INTRAVENOUS
Status: COMPLETED
Start: 2018-03-07 | End: 2018-03-07

## 2018-03-07 RX ORDER — LORAZEPAM 2 MG/ML
0.5 INJECTION INTRAMUSCULAR ONCE
Status: COMPLETED | OUTPATIENT
Start: 2018-03-07 | End: 2018-03-07

## 2018-03-07 RX ADMIN — KETOROLAC TROMETHAMINE 15 MG: 15 INJECTION, SOLUTION INTRAMUSCULAR; INTRAVENOUS at 21:16

## 2018-03-07 RX ADMIN — LORAZEPAM 0.5 MG: 2 INJECTION INTRAMUSCULAR; INTRAVENOUS at 22:02

## 2018-03-07 RX ADMIN — PROCHLORPERAZINE EDISYLATE 5 MG: 5 INJECTION INTRAMUSCULAR; INTRAVENOUS at 21:14

## 2018-03-07 RX ADMIN — DIPHENHYDRAMINE HYDROCHLORIDE 25 MG: 50 INJECTION INTRAMUSCULAR; INTRAVENOUS at 21:59

## 2018-03-07 RX ADMIN — DIPHENHYDRAMINE HYDROCHLORIDE 25 MG: 50 INJECTION, SOLUTION INTRAMUSCULAR; INTRAVENOUS at 21:21

## 2018-03-07 RX ADMIN — DIPHENHYDRAMINE HYDROCHLORIDE 25 MG: 50 INJECTION, SOLUTION INTRAMUSCULAR; INTRAVENOUS at 21:59

## 2018-03-07 RX ADMIN — SODIUM CHLORIDE 1000 ML: 9 INJECTION, SOLUTION INTRAVENOUS at 21:13

## 2018-03-07 ASSESSMENT — ENCOUNTER SYMPTOMS
FEVER: 0
CHILLS: 0
HEADACHES: 1
NUMBNESS: 1

## 2018-03-07 NOTE — ED AVS SNAPSHOT
Emergency Department    [address] [place] [address]    Phone:  [phone]    Fax:  [phone]                                       Jacklyn Arriola   MRN: [id number]    Department:   Emergency Department   Date of Visit:  3/7/2018           Patient Information     Date Of Birth          1950        Your diagnoses for this visit were:     Migraine without status migrainosus, not intractable, unspecified migraine type        You were seen by Daniel Martínez MD.      Follow-up Information     Follow up with [facility] [address].    Contact information:    [address] [place] [address]          Discharge Instructions       Discharge Instructions  Headache    You were seen today for a headache. Headaches may be caused by many different things such as muscle tension, sinus inflammation, anxiety and stress, having too little sleep, too much alcohol, some medical conditions or injury. You may have a migraine, which is caused by changes in the blood vessels in your head.  At this time your doctor does not find that your headache is a sign of anything dangerous or life-threatening.  However, sometimes the signs of serious illness do not show up right away.  If you have new or worse symptoms, you may need to be seen again in the emergency department or by your primary doctor.      Return to the Emergency Department if:    You get a fever of 101 F or higher.    Your headache gets much worse.    You get a stiff neck with your headache.    You get a new headache that is different or worse than headaches you have had before.    You are vomiting and can t keep food or water down.    You have blurry or double vision or other problems with your eyes.    You have a new weakness on one side of your body.    You have difficulty with balance which is new.    You or your family thinks you are confused.    You have a seizure or convulsion.    What can I do to help myself?    Pain  medications - You may take a pain medication such as Tylenol  (acetaminophen), Advil , Nuprin  (ibuprofen) or Aleve  (naproxen).  If you have been given a narcotic such as Vicodin  (hydrocodone with acetaminophen), Percocet  (oxycodone with acetaminophen), codeine, or a muscle relaxant such as Flexeril  (cyclobenzaprine) or Soma  (carisoprodol), do not drive for four hours after you have taken it. If the narcotic contains Tylenol  (acetaminophen), do not take Tylenol  with it. All narcotics will cause constipation, so eat a high fiber diet.        Take a pain reliever as soon as you notice symptoms.  Starting medications as soon as you start to have symptoms may lessen the amount of pain you have.    Relaxing in a quiet, dark room may help.    Get enough sleep and eat meals regularly.    Schedule an appointment with your primary physician as instructed, or at least within 1 week.    You may need to watch for certain foods or other things which may trigger your headaches.  Keeping a journal of your headaches and possible triggers may help you and your primary doctor to identify things which you should avoid which may be causing your headaches.  If you were given a prescription for medicine here today, be sure to read all of the information (including the package insert) that comes with your prescription.  This will include important information about the medicine, its side effects, and any warnings that you need to know about.  The pharmacist who fills the prescription can provide more information and answer questions you may have about the medicine.  If you have questions or concerns that the pharmacist cannot address, please call or return to the Emergency Department.   Opioid Medication Information    Pain medications are among the most commonly prescribed medicines, so we are including this information for all our patients. If you did not receive pain medication or get a prescription for pain medicine, you can  ignore it.     You may have been given a prescription for an opioid (narcotic) pain medicine and/or have received a pain medicine while here in the Emergency Department. These medicines can make you drowsy or impaired. You must not drive, operate dangerous equipment, or engage in any other dangerous activities while taking these medications. If you drive while taking these medications, you could be arrested for DUI, or driving under the influence. Do not drink any alcohol while you are taking these medications.     Opioid pain medications can cause addiction. If you have a history of chemical dependency of any type, you are at a higher risk of becoming addicted to pain medications.  Only take these prescribed medications to treat your pain when all other options have been tried. Take it for as short a time and as few doses as possible. Store your pain pills in a secure place, as they are frequently stolen and provide a dangerous opportunity for children or visitors in your house to start abusing these powerful medications. We will not replace any lost or stolen medicine.  As soon as your pain is better, you should flush all your remaining medication.     Many prescription pain medications contain Tylenol  (acetaminophen), including Vicodin , Tylenol #3 , Norco , Lortab , and Percocet .  You should not take any extra pills of Tylenol  if you are using these prescription medications or you can get very sick.  Do not ever take more than 3000 mg of acetaminophen in any 24 hour period.    All opioids tend to cause constipation. Drink plenty of water and eat foods that have a lot of fiber, such as fruits, vegetables, prune juice, apple juice and high fiber cereal.  Take a laxative if you don t move your bowels at least every other day. Miralax , Milk of Magnesia, Colace , or Senna  can be used to keep you regular.      Remember that you can always come back to the Emergency Department if you are not able to see your  regular doctor in the amount of time listed above, if you get any new symptoms, or if there is anything that worries you.          24 Hour Appointment Hotline       To make an appointment at any [facility], call [phone] ([phone]). If you don't have a family doctor or clinic, we will help you find one. [facility] clinics are conveniently located to serve the needs of you and your family.             Review of your medicines      START taking        Dose / Directions Last dose taken    butalbital-acetaminophen-caffeine -40 MG per tablet   Commonly known as:  FIORICET/ESGIC   Dose:  1 tablet   Quantity:  28 tablet        Take 1 tablet by mouth every 4 hours as needed   Refills:  0          Our records show that you are taking the medicines listed below. If these are incorrect, please call your family doctor or clinic.        Dose / Directions Last dose taken    adalimumab 40 MG/0.8ML prefilled syringe kit   Commonly known as:  HUMIRA   Dose:  40 mg        Inject 40 mg Subcutaneous every 7 days   Refills:  0        MERCAPTOPURINE PO   Dose:  50 mg        Take 50 mg by mouth daily   Refills:  0        polyethylene glycol powder   Commonly known as:  MIRALAX/GLYCOLAX   Dose:  1 capful        Take 1 capful by mouth as needed for constipation   Refills:  0        PRINZIDE 20-12.5 MG per tablet   Generic drug:  lisinopril-hydrochlorothiazide        2 tablets daily   Refills:  0        TOPROL  MG 24 hr tablet   Generic drug:  metoprolol succinate        1 TABLET DAILY   Refills:  0                Prescriptions were sent or printed at these locations (1 Prescription)                   Other Prescriptions                Printed at Department/Unit printer (1 of 1)         butalbital-acetaminophen-caffeine (FIORICET/ESGIC) -40 MG per tablet                Orders Needing Specimen Collection     None      Pending Results     No orders found from 3/5/2018 to 3/8/2018.            Pending Culture  Results     No orders found from 3/5/2018 to 3/8/2018.            Pending Results Instructions     If you had any lab results that were not finalized at the time of your Discharge, you can call the ED Lab Result RN at [phone]. You will be contacted by this team for any positive Lab results or changes in treatment. The nurses are available 7 days a week from 10A to 6:30P.  You can leave a message 24 hours per day and they will return your call.        Test Results From Your Hospital Stay               Clinical Quality Measure: Blood Pressure Screening     Your blood pressure was checked while you were in the emergency department today. The last reading we obtained was  BP: 131/77 . Please read the guidelines below about what these numbers mean and what you should do about them.  If your systolic blood pressure (the top number) is less than 120 and your diastolic blood pressure (the bottom number) is less than 80, then your blood pressure is normal. There is nothing more that you need to do about it.  If your systolic blood pressure (the top number) is 120-139 or your diastolic blood pressure (the bottom number) is 80-89, your blood pressure may be higher than it should be. You should have your blood pressure rechecked within a year by a primary care provider.  If your systolic blood pressure (the top number) is 140 or greater or your diastolic blood pressure (the bottom number) is 90 or greater, you may have high blood pressure. High blood pressure is treatable, but if left untreated over time it can put you at risk for heart attack, stroke, or kidney failure. You should have your blood pressure rechecked by a primary care provider within the next 4 weeks.  If your provider in the emergency department today gave you specific instructions to follow-up with your doctor or provider even sooner than that, you should follow that instruction and not wait for up to 4 weeks for your follow-up visit.        Thank you for  "choosing [facility]       Thank you for choosing [facility] for your care. Our goal is always to provide you with excellent care. Hearing back from our patients is one way we can continue to improve our services. Please take a few minutes to complete the written survey that you may receive in the mail after you visit with us. Thank you!        [employer] Information     M[employer] lets you send messages to your doctor, view your test results, renew your prescriptions, schedule appointments and more. To sign up, go to [email or web address] . Click on \"Log in\" on the left side of the screen, which will take you to the Welcome page. Then click on \"Sign up Now\" on the right side of the page.     You will be asked to enter the access code listed below, as well as some personal information. Please follow the directions to create your username and password.     Your access code is: [email or web address]  Expires: 2018 10:58 PM     Your access code will  in 90 days. If you need help or a new code, please call your [facility] clinic or [phone].        Care EveryWhere ID     This is your Care EveryWhere ID. This could be used by other organizations to access your [facility] medical records  [id number]        Equal Access to Services     GAYE VALERO : Shy villegaso Swapnil, waaxda luqadaha, qaybta kaalmada adesjyada, marjorie ram. So [facility] [phone].    ATENCIÓN: Si habla español, tiene a foster disposición servicios gratuitos de asistencia lingüística. Llame al [phone].    We comply with applicable federal civil rights laws and [place] laws. We do not discriminate on the basis of race, color, national origin, age, disability, sex, sexual orientation, or gender identity.            After Visit Summary       This is your record. Keep this with you and show to your community pharmacist(s) and doctor(s) at your next visit.                  "

## 2018-03-07 NOTE — ED AVS SNAPSHOT
Emergency Department    [address] [facility] [address]    Phone:  [phone]    Fax:  [phone]                                       Jacklyn Arriola   MRN: [id number]    Department:   Emergency Department   Date of Visit:  3/7/2018           After Visit Summary Signature Page     I have received my discharge instructions, and my questions have been answered. I have discussed any challenges I see with this plan with the nurse or doctor.    ..........................................................................................................................................  Patient/Patient Representative Signature      ..........................................................................................................................................  Patient Representative Print Name and Relationship to Patient    ..................................................               ................................................  Date                                            Time    ..........................................................................................................................................  Reviewed by Signature/Title    ...................................................              ..............................................  Date                                                            Time

## 2018-03-08 NOTE — ED PROVIDER NOTES
"  History     Chief Complaint:  Headache    HPI   Jacklyn Arriola is a 67 year old female with a medical history of Crohn's disease, chronic anemia, migraines, and hypertension who presents with a headache. Patient has a history of migraines when she was a teenager, and did not have problems since then. Patient had an onset of aura with \"flickery lights\" in one eye and a headache located on the other side on Sunday morning, about 3.5 days ago. Her headache lasted throughout the day. The headache resolved by Monday, but patient states that she felt like it was going to start again. Patient had similar symptoms on Tuesday and today. She also has associated numbness. Patient did not take anything for her headache, and patient does not taken ibuprofen due to her Crohn's disease. Patient has an appointment with her primary care in the next few days. Patient denies fevers or chills.     Allergies:  Ibuprofen  Innovar  Sulfa drugs     Medications:    Mercaptopurine   Miralax  Humira  Toprol XL  Prinizide    Past Medical History:    Anemia  Crohn's disease  Hypertension    Past Surgical History:    Hysterectomy    Family History:    History reviewed. No pertinent family history.      Social History:  Smoking status: Never smoker  Alcohol use: No   Marital Status:   [2]     Review of Systems   Constitutional: Negative for chills and fever.   Neurological: Positive for numbness and headaches.   All other systems reviewed and are negative.    Physical Exam   Patient Vitals for the past 24 hrs:   BP Temp Temp src Heart Rate Resp SpO2 Height Weight   03/07/18 2123 131/77 - - - - 95 % - -   03/07/18 1938 176/79 98  F (36.7  C) Oral 86 18 98 % 1.651 m (5' 5\") 93 kg (205 lb)      Physical Exam  GENERAL: well developed, pleasant  HEAD: atraumatic  EYES: pupils reactive, extraocular muscles intact, conjunctivae normal  ENT:  mucus membranes moist  NECK:  trachea midline, normal range of motion  RESPIRATORY: no tachypnea, " breath sounds clear to auscultation   CVS: normal S1/S2, no murmurs, intact distal pulses  ABDOMEN: soft, nontender, nondistention  MUSCULOSKELETAL: no deformities  SKIN: warm and dry, no acute rashes or ulceration  NEURO: GCS 15, cranial nerves intact, alert and oriented x3  PSYCH:  Mood/affect normal    Emergency Department Course   Interventions:  2113: NS 1L IV Bolus   2114: Compazine 5 mg IV  2116: Toradol 15 mg IV  2121: Benadryl 25 mg IV  2159: Benadryl 25 mg IV  2202: Ativan 0.5 mg IV    Emergency Department Course:  Past medical records, nursing notes, and vitals reviewed.  2052: I performed an exam of the patient and obtained history, as documented above.      2217: I rechecked the patient, patient feels better after the given interventions.    I rechecked the patient. Findings and plan explained to the Patient. Patient discharged home with instructions regarding supportive care, medications, and reasons to return. The importance of close follow-up was reviewed.      Impression & Plan    Medical Decision Making:  Jacklyn Arriola is a 67 year old female who presents with a migraine headache. She's had this intermittently in the past. She was given compazine, benadryl, Toradol and fluids, and had some mild akathisia  from the compazine, so repeat benadryl and ativan was ordered. Most of the headache has improved. Currently waiting improvements from the side effects of the compazine.  Above medications have helped with the side effects from compazine.  Given the sedating nature of the medications, I signed the patient out to my partner to let her wait until her ride arrives and doesn't get excessively sedated.  While I was with her, this did not occur.    Diagnosis:    ICD-10-CM   1. Migraine without status migrainosus, not intractable, unspecified migraine type G43.909     Disposition:  discharged to home    Discharge Medications:  New Prescriptions    BUTALBITAL-ACETAMINOPHEN-CAFFEINE (FIORICET/ESGIC)  -40 MG PER TABLET    Take 1 tablet by mouth every 4 hours as needed     Isabel Aleman  3/7/2018    EMERGENCY DEPARTMENT  I, Isabel Aleman, am serving as a scribe at 8:52 PM on 3/7/2018 to document services personally performed by Daniel Martínez MD based on my observations and the provider's statements to me.       Daniel Martínez MD  03/08/18 4006

## 2018-03-08 NOTE — ED NOTES
Pt began to feel very anxious after 2nd dose of Benadryl. MD notified and Ativan given. After 2 mins pt able to sit back in the bed and relax.

## 2019-12-16 ENCOUNTER — APPOINTMENT (OUTPATIENT)
Dept: CT IMAGING | Facility: CLINIC | Age: 69
DRG: 389 | End: 2019-12-16
Attending: EMERGENCY MEDICINE
Payer: MEDICARE

## 2019-12-16 ENCOUNTER — HOSPITAL ENCOUNTER (INPATIENT)
Facility: CLINIC | Age: 69
LOS: 3 days | Discharge: HOME | DRG: 389 | End: 2019-12-19
Attending: EMERGENCY MEDICINE | Admitting: INTERNAL MEDICINE
Payer: MEDICARE

## 2019-12-16 DIAGNOSIS — K56.609 SBO (SMALL BOWEL OBSTRUCTION) (H): Primary | ICD-10-CM

## 2019-12-16 DIAGNOSIS — K56.609 SMALL BOWEL OBSTRUCTION (H): ICD-10-CM

## 2019-12-16 LAB
ALBUMIN SERPL-MCNC: 4.5 G/DL (ref 3.4–5)
ALBUMIN UR-MCNC: NEGATIVE MG/DL
ALP SERPL-CCNC: 82 U/L (ref 40–150)
ALT SERPL W P-5'-P-CCNC: 38 U/L (ref 0–50)
ANION GAP SERPL CALCULATED.3IONS-SCNC: 8 MMOL/L (ref 3–14)
APPEARANCE UR: CLEAR
AST SERPL W P-5'-P-CCNC: 22 U/L (ref 0–45)
BACTERIA #/AREA URNS HPF: ABNORMAL /HPF
BASOPHILS # BLD AUTO: 0 10E9/L (ref 0–0.2)
BASOPHILS NFR BLD AUTO: 0.5 %
BILIRUB SERPL-MCNC: 0.4 MG/DL (ref 0.2–1.3)
BILIRUB UR QL STRIP: NEGATIVE
BUN SERPL-MCNC: 15 MG/DL (ref 7–30)
CALCIUM SERPL-MCNC: 10.6 MG/DL (ref 8.5–10.1)
CHLORIDE SERPL-SCNC: 105 MMOL/L (ref 94–109)
CO2 SERPL-SCNC: 26 MMOL/L (ref 20–32)
COLOR UR AUTO: ABNORMAL
CREAT SERPL-MCNC: 0.69 MG/DL (ref 0.52–1.04)
DIFFERENTIAL METHOD BLD: ABNORMAL
EOSINOPHIL # BLD AUTO: 0.2 10E9/L (ref 0–0.7)
EOSINOPHIL NFR BLD AUTO: 2 %
ERYTHROCYTE [DISTWIDTH] IN BLOOD BY AUTOMATED COUNT: 20.3 % (ref 10–15)
GFR SERPL CREATININE-BSD FRML MDRD: 89 ML/MIN/{1.73_M2}
GLUCOSE SERPL-MCNC: 95 MG/DL (ref 70–99)
GLUCOSE UR STRIP-MCNC: NEGATIVE MG/DL
HCT VFR BLD AUTO: 43.8 % (ref 35–47)
HGB BLD-MCNC: 14.3 G/DL (ref 11.7–15.7)
HGB UR QL STRIP: NEGATIVE
IMM GRANULOCYTES # BLD: 0 10E9/L (ref 0–0.4)
IMM GRANULOCYTES NFR BLD: 0.2 %
KETONES UR STRIP-MCNC: NEGATIVE MG/DL
LEUKOCYTE ESTERASE UR QL STRIP: ABNORMAL
LIPASE SERPL-CCNC: 61 U/L (ref 73–393)
LYMPHOCYTES # BLD AUTO: 0.6 10E9/L (ref 0.8–5.3)
LYMPHOCYTES NFR BLD AUTO: 7.1 %
MCH RBC QN AUTO: 30 PG (ref 26.5–33)
MCHC RBC AUTO-ENTMCNC: 32.6 G/DL (ref 31.5–36.5)
MCV RBC AUTO: 92 FL (ref 78–100)
MONOCYTES # BLD AUTO: 0.7 10E9/L (ref 0–1.3)
MONOCYTES NFR BLD AUTO: 7.5 %
NEUTROPHILS # BLD AUTO: 7.3 10E9/L (ref 1.6–8.3)
NEUTROPHILS NFR BLD AUTO: 82.7 %
NITRATE UR QL: NEGATIVE
NRBC # BLD AUTO: 0 10*3/UL
NRBC BLD AUTO-RTO: 0 /100
PH UR STRIP: 6 PH (ref 5–7)
PLATELET # BLD AUTO: 374 10E9/L (ref 150–450)
POTASSIUM SERPL-SCNC: 3.7 MMOL/L (ref 3.4–5.3)
PROT SERPL-MCNC: 8.5 G/DL (ref 6.8–8.8)
RBC # BLD AUTO: 4.77 10E12/L (ref 3.8–5.2)
RBC #/AREA URNS AUTO: 2 /HPF (ref 0–2)
SODIUM SERPL-SCNC: 139 MMOL/L (ref 133–144)
SOURCE: ABNORMAL
SP GR UR STRIP: 1 (ref 1–1.03)
SQUAMOUS #/AREA URNS AUTO: <1 /HPF (ref 0–1)
UROBILINOGEN UR STRIP-MCNC: NORMAL MG/DL (ref 0–2)
WBC # BLD AUTO: 8.8 10E9/L (ref 4–11)
WBC #/AREA URNS AUTO: 5 /HPF (ref 0–5)

## 2019-12-16 PROCEDURE — 83690 ASSAY OF LIPASE: CPT | Performed by: EMERGENCY MEDICINE

## 2019-12-16 PROCEDURE — 99223 1ST HOSP IP/OBS HIGH 75: CPT | Mod: AI | Performed by: INTERNAL MEDICINE

## 2019-12-16 PROCEDURE — 85025 COMPLETE CBC W/AUTO DIFF WBC: CPT | Performed by: EMERGENCY MEDICINE

## 2019-12-16 PROCEDURE — 25000125 ZZHC RX 250: Performed by: EMERGENCY MEDICINE

## 2019-12-16 PROCEDURE — 70450 CT HEAD/BRAIN W/O DYE: CPT

## 2019-12-16 PROCEDURE — 81001 URINALYSIS AUTO W/SCOPE: CPT | Performed by: EMERGENCY MEDICINE

## 2019-12-16 PROCEDURE — 25000128 H RX IP 250 OP 636: Performed by: EMERGENCY MEDICINE

## 2019-12-16 PROCEDURE — 74177 CT ABD & PELVIS W/CONTRAST: CPT

## 2019-12-16 PROCEDURE — 80053 COMPREHEN METABOLIC PANEL: CPT | Performed by: EMERGENCY MEDICINE

## 2019-12-16 PROCEDURE — 25000132 ZZH RX MED GY IP 250 OP 250 PS 637: Mod: GY | Performed by: INTERNAL MEDICINE

## 2019-12-16 PROCEDURE — 12000000 ZZH R&B MED SURG/OB

## 2019-12-16 PROCEDURE — 25000128 H RX IP 250 OP 636: Performed by: INTERNAL MEDICINE

## 2019-12-16 PROCEDURE — 99285 EMERGENCY DEPT VISIT HI MDM: CPT | Mod: 25

## 2019-12-16 RX ORDER — PROCHLORPERAZINE MALEATE 5 MG
5 TABLET ORAL EVERY 6 HOURS PRN
Status: DISCONTINUED | OUTPATIENT
Start: 2019-12-16 | End: 2019-12-19 | Stop reason: HOSPADM

## 2019-12-16 RX ORDER — POTASSIUM CHLORIDE 7.45 MG/ML
10 INJECTION INTRAVENOUS
Status: DISCONTINUED | OUTPATIENT
Start: 2019-12-16 | End: 2019-12-19 | Stop reason: HOSPADM

## 2019-12-16 RX ORDER — BUTALBITAL, ACETAMINOPHEN AND CAFFEINE 50; 325; 40 MG/1; MG/1; MG/1
1 TABLET ORAL EVERY 4 HOURS PRN
Status: DISCONTINUED | OUTPATIENT
Start: 2019-12-16 | End: 2019-12-19 | Stop reason: HOSPADM

## 2019-12-16 RX ORDER — MORPHINE SULFATE 2 MG/ML
1 INJECTION, SOLUTION INTRAMUSCULAR; INTRAVENOUS
Status: DISCONTINUED | OUTPATIENT
Start: 2019-12-16 | End: 2019-12-19 | Stop reason: HOSPADM

## 2019-12-16 RX ORDER — ONDANSETRON 4 MG/1
4 TABLET, ORALLY DISINTEGRATING ORAL EVERY 6 HOURS PRN
Status: DISCONTINUED | OUTPATIENT
Start: 2019-12-16 | End: 2019-12-19 | Stop reason: HOSPADM

## 2019-12-16 RX ORDER — POTASSIUM CHLORIDE 1.5 G/1.58G
20-40 POWDER, FOR SOLUTION ORAL
Status: DISCONTINUED | OUTPATIENT
Start: 2019-12-16 | End: 2019-12-19 | Stop reason: HOSPADM

## 2019-12-16 RX ORDER — METOPROLOL SUCCINATE 100 MG/1
100 TABLET, EXTENDED RELEASE ORAL DAILY
Status: DISCONTINUED | OUTPATIENT
Start: 2019-12-17 | End: 2019-12-19 | Stop reason: HOSPADM

## 2019-12-16 RX ORDER — NALOXONE HYDROCHLORIDE 0.4 MG/ML
.1-.4 INJECTION, SOLUTION INTRAMUSCULAR; INTRAVENOUS; SUBCUTANEOUS
Status: DISCONTINUED | OUTPATIENT
Start: 2019-12-16 | End: 2019-12-19 | Stop reason: HOSPADM

## 2019-12-16 RX ORDER — PROCHLORPERAZINE 25 MG
12.5 SUPPOSITORY, RECTAL RECTAL EVERY 12 HOURS PRN
Status: DISCONTINUED | OUTPATIENT
Start: 2019-12-16 | End: 2019-12-19 | Stop reason: HOSPADM

## 2019-12-16 RX ORDER — MERCAPTOPURINE 50 MG/1
50 TABLET ORAL AT BEDTIME
Status: DISCONTINUED | OUTPATIENT
Start: 2019-12-16 | End: 2019-12-19 | Stop reason: HOSPADM

## 2019-12-16 RX ORDER — POTASSIUM CHLORIDE 1500 MG/1
20-40 TABLET, EXTENDED RELEASE ORAL
Status: DISCONTINUED | OUTPATIENT
Start: 2019-12-16 | End: 2019-12-19 | Stop reason: HOSPADM

## 2019-12-16 RX ORDER — MAGNESIUM SULFATE HEPTAHYDRATE 40 MG/ML
4 INJECTION, SOLUTION INTRAVENOUS EVERY 4 HOURS PRN
Status: DISCONTINUED | OUTPATIENT
Start: 2019-12-16 | End: 2019-12-19 | Stop reason: HOSPADM

## 2019-12-16 RX ORDER — OXYCODONE HYDROCHLORIDE 5 MG/1
5-10 TABLET ORAL
Status: DISCONTINUED | OUTPATIENT
Start: 2019-12-16 | End: 2019-12-19 | Stop reason: HOSPADM

## 2019-12-16 RX ORDER — METOCLOPRAMIDE HYDROCHLORIDE 5 MG/ML
5 INJECTION INTRAMUSCULAR; INTRAVENOUS EVERY 6 HOURS PRN
Status: DISCONTINUED | OUTPATIENT
Start: 2019-12-16 | End: 2019-12-19 | Stop reason: HOSPADM

## 2019-12-16 RX ORDER — POTASSIUM CHLORIDE 29.8 MG/ML
20 INJECTION INTRAVENOUS
Status: DISCONTINUED | OUTPATIENT
Start: 2019-12-16 | End: 2019-12-19 | Stop reason: HOSPADM

## 2019-12-16 RX ORDER — SODIUM CHLORIDE AND POTASSIUM CHLORIDE 150; 900 MG/100ML; MG/100ML
INJECTION, SOLUTION INTRAVENOUS CONTINUOUS
Status: DISCONTINUED | OUTPATIENT
Start: 2019-12-16 | End: 2019-12-19

## 2019-12-16 RX ORDER — LISINOPRIL 20 MG/1
20 TABLET ORAL DAILY
Status: DISCONTINUED | OUTPATIENT
Start: 2019-12-16 | End: 2019-12-19 | Stop reason: HOSPADM

## 2019-12-16 RX ORDER — POTASSIUM CL/LIDO/0.9 % NACL 10MEQ/0.1L
10 INTRAVENOUS SOLUTION, PIGGYBACK (ML) INTRAVENOUS
Status: DISCONTINUED | OUTPATIENT
Start: 2019-12-16 | End: 2019-12-19 | Stop reason: HOSPADM

## 2019-12-16 RX ORDER — METOCLOPRAMIDE 5 MG/1
5 TABLET ORAL EVERY 6 HOURS PRN
Status: DISCONTINUED | OUTPATIENT
Start: 2019-12-16 | End: 2019-12-19 | Stop reason: HOSPADM

## 2019-12-16 RX ORDER — LIDOCAINE 40 MG/G
CREAM TOPICAL
Status: DISCONTINUED | OUTPATIENT
Start: 2019-12-16 | End: 2019-12-19 | Stop reason: HOSPADM

## 2019-12-16 RX ORDER — IOPAMIDOL 755 MG/ML
98 INJECTION, SOLUTION INTRAVASCULAR ONCE
Status: COMPLETED | OUTPATIENT
Start: 2019-12-16 | End: 2019-12-16

## 2019-12-16 RX ORDER — ACETAMINOPHEN 325 MG/1
650 TABLET ORAL EVERY 4 HOURS PRN
Status: DISCONTINUED | OUTPATIENT
Start: 2019-12-16 | End: 2019-12-19 | Stop reason: HOSPADM

## 2019-12-16 RX ORDER — ONDANSETRON 2 MG/ML
4 INJECTION INTRAMUSCULAR; INTRAVENOUS EVERY 6 HOURS PRN
Status: DISCONTINUED | OUTPATIENT
Start: 2019-12-16 | End: 2019-12-19 | Stop reason: HOSPADM

## 2019-12-16 RX ADMIN — MERCAPTOPURINE 50 MG: 50 TABLET ORAL at 22:43

## 2019-12-16 RX ADMIN — BUTALBITAL, ACETAMINOPHEN, AND CAFFEINE 1 TABLET: 50; 325; 40 TABLET ORAL at 23:50

## 2019-12-16 RX ADMIN — POTASSIUM CHLORIDE AND SODIUM CHLORIDE: 900; 150 INJECTION, SOLUTION INTRAVENOUS at 21:14

## 2019-12-16 RX ADMIN — SODIUM CHLORIDE 70 ML: 9 INJECTION, SOLUTION INTRAVENOUS at 16:51

## 2019-12-16 RX ADMIN — LISINOPRIL 20 MG: 20 TABLET ORAL at 21:52

## 2019-12-16 RX ADMIN — IOPAMIDOL 98 ML: 755 INJECTION, SOLUTION INTRAVENOUS at 16:49

## 2019-12-16 SDOH — HEALTH STABILITY: MENTAL HEALTH: HOW OFTEN DO YOU HAVE A DRINK CONTAINING ALCOHOL?: NEVER

## 2019-12-16 ASSESSMENT — ENCOUNTER SYMPTOMS
FEVER: 0
DYSURIA: 1
CONSTIPATION: 1
DIARRHEA: 0
ABDOMINAL DISTENTION: 1
ABDOMINAL PAIN: 1
APPETITE CHANGE: 1

## 2019-12-16 ASSESSMENT — MIFFLIN-ST. JEOR
SCORE: 1434.88
SCORE: 1410.39

## 2019-12-16 NOTE — ED PROVIDER NOTES
"  History     Chief Complaint:  Abdominal Pain      HPI   Jacklyn Arriola is a 69 year old female with a history of Crohn's disease (on Humira), malabsorption, and hypertension who presents with abdominal pain. 1.5 weeks ago she was placed on Amoxicillin for a URI/cold. This was 1000mg BID and within two days she began having \"shooting\" diarrhea. Therefore, she was transitioned to a Z-camilo and her diarrhea stopped. However, since then, she reports intermittent abdominal bloating and nausea with decreased appetite for the last several days. Her pain and bloating are in her upper abdomen, which is normal for her Crohn's. Additionally, she has had decreased bowel movements and had dysuria over the weekend, for which she used Azo. She denies fever.     Allergies:  Ibuprofen  Innovar [Fentanyl-Droperidol]  Sulfa Drugs      Medications:    Humira   Fioricet   Mercaptopurine   Miralax   Prinzide   Toprol XL     Past Medical History:    Anemia  Crohn's disease  Hypertension   Malabsorption   Iron deficiency     Past Surgical History:    Hysterectomy     Family History:    History reviewed. No pertinent family history.      Social History:  The patient was alone.  Smoking Status: Never  Smokeless Tobacco: Never  Alcohol Use: Never  Marital Status:        Review of Systems   Constitutional: Positive for appetite change. Negative for fever.   Gastrointestinal: Positive for abdominal distention, abdominal pain and constipation. Negative for diarrhea (not since the Amoxicillin).   Genitourinary: Positive for dysuria.   All other systems reviewed and are negative.        Physical Exam     Physical Exam    Patient Vitals for the past 24 hrs:   BP Temp Temp src Pulse Resp SpO2 Height Weight   12/16/19 2100 -- -- -- -- -- -- 1.651 m (5' 5\") 90.9 kg (200 lb 6.4 oz)   12/16/19 2030 (!) 157/86 98.7  F (37.1  C) Oral 88 18 95 % -- --   12/16/19 1800 (!) 152/92 -- -- -- -- -- -- --   12/16/19 1414 (!) 159/77 98.1  F (36.7  C) " "Oral 93 18 96 % 1.651 m (5' 5\") 88.5 kg (195 lb)       General: Alert and Interactive.   Head: No signs of trauma.   Mouth/Throat: Oropharynx is clear and moist.   Eyes: Conjunctivae are normal. Pupils are equal, round, and reactive to light.   Neck: Normal range of motion. No nuchal rigidity.   CV: Normal rate and regular rhythm.    Resp: Effort normal and breath sounds normal. No respiratory distress.   GI: Soft. There is no guarding. Abdominal distension. Epigastric pain. Bowel sounds present.   MSK: Normal range of motion. no edema.   Neuro: The patient is alert and oriented to person, place, and time.  PERRLA, EOMI, strength in upper/lower extremities normal and symmetrical.   Sensation normal. Speech normal.  GCS eye subscore is 4. GCS verbal subscore is 5. GCS motor subscore is 6.   Skin: Skin is warm and dry. No rash noted.   Psych: normal mood and affect. behavior is normal.     Emergency Department Course     Imaging:  Radiology findings were communicated with the patient and Admitting MD who voiced understanding of the findings.    Head CT w/o contrast  Final Result  IMPRESSION: No acute intracranial abnormality.  JUAN ALBERTO KELLY MD    CT Abdomen Pelvis w Contrast  IMPRESSION:  1. Multiple dilated small bowel loops with a transition point in the  right lower quadrant, finding consistent with a small-bowel  obstruction.  2. Hepatic steatosis.  3. Colonic diverticulosis without CT evidence of acute diverticulitis.  4. Multiple irregular-shaped radiodense objects within the lumen of  the small bowel, indeterminant, could represent ingested  food/fragments versus less likely medication pills.  Report per radiology     Laboratory:  Laboratory findings were communicated with the patient and Admitting MD who voiced understanding of the findings.    CBC: AWNL. (WBC 8.8, HGB 14.3, )   CMP: Calcium 10.6 (H) o/w WNL (Creatinine 0.69)     Lipase: 61 (L)    UA: Light yellow, clear urine. LE urine small, " "bacteria urine few o/w WNL      Emergency Department Course:  Past medical records, nursing notes, and vitals reviewed.    1537 I performed an exam of the patient as documented above.     IV was inserted and blood was drawn for laboratory testing, results above.  The patient provided a urine sample here in the emergency department. This was sent for laboratory testing, findings above.  The patient was sent for a CT Abdomen Pelvis w Contrast while in the emergency department, results above.     1730 I rechecked the patient and discussed the results of her workup thus far.     1801  I spoke with Dr. Dangelo of the Hospitalist service regarding patient's presentation, findings, and plan of care.    1810 Patient does not like NG tube. Will try giving her benzocaine 20% spray.    1909 I was called into patient's room as RN was about to give her benzocaine 20% spray. She no longer remembered where she was, how she had gotten here, and why she was here. She did not remember seeing me earlier in the ED visit. Patient reports that she has never been this forgetful before. She does not know what year it is, what day it is, nor what the month is. She is asking where her  is. States \"I am really confused and out of it.\"    1911 Exited room to speak with hospitalist Dr. Dangelo.     1919 I spoke with Dr. Dangelo concerning patient's recent altered mental status.      Findings and plan explained to the Patient who consents to admission. Discussed the patient with Dr. Dangelo, who will admit the patient to an inpatient surgical bed for further monitoring, evaluation, and treatment.    I personally reviewed the laboratory and imaging results with the Patient and answered all related questions prior to admission.     Impression & Plan     Medical Decision Making:  Jacklyn Arriola is a 69 year old female who presents for evaluation of abdominal pain and distension.  There has been decreased rectal gas and stool.  Clinically this is " consistent with bowel obstruction, CT confirms this.  Patient has been resuscitated with IVF. Hospitalist requested NG tube for admission, and so this was placed. Patient did become very anxious when the NG was placed and did not seem to tolerate this well. Benzocaine spray was ordered for her and, in the interim, she suddenly stated that she did not know what was going on. She was unable to state why she was here, how she got here, and did not remember our previous evaluation. After consultation with the hospitalist, Dr. Dangelo, we agreed to order Head CT from the emergency department and still admit under his care. CT Head was completed, and was thankfully negative for acute intracranial pathology. No neuro consult needed at this time. Prior to transfer to the inpatient setting, patient did start returning to baseline, stating that she recognized me and was remembering her presentation earlier this evening. She was then admitted to medicine for further cares and bowel rest.  No indication at this point for emergency surgical consult.  There are no signs of surgical emergencies or intraabdominal catastrophes such as volvulus, gut ischemia, perforation, etc.      Diagnosis:    ICD-10-CM    1. Small bowel obstruction (H) K56.609        Disposition:  Admitted to an inpatient surgical bed.    Scribe Disclosure:  I, Jayda De Paz, am serving as a scribe at 3:13 PM on 12/16/2019 to document services personally performed by Greg Crow MD based on my observations and the provider's statements to me.   12/16/2019    EMERGENCY DEPARTMENT       Greg Crow MD  12/16/19 8534

## 2019-12-16 NOTE — PHARMACY-ADMISSION MEDICATION HISTORY
Pharmacy Medication History  Admission medication history interview status for the 12/16/2019  admission is complete. See EPIC admission navigator for prior to admission medications     Medication history sources: Patient  Medication history source reliability: Good  Adherence assessment: Good    Significant changes made to the medication list:  Deleted humira, added remicade, feraheme, artificial tears      Additional medication history information:   none    Medication reconciliation completed by provider prior to medication history? No    Time spent in this activity: 10 minutes      Prior to Admission medications    Medication Sig Last Dose Taking? Auth Provider   Ferumoxytol (FERAHEME IV) Inject into the vein as needed Every 2 months prn for low Hgb Past Month at Unknown time Yes Unknown, Entered By History   hypromellose-dextran (ARTIFICAL TEARS) 0.1-0.3 % ophthalmic solution Place 1 drop into both eyes 2 times daily as needed for dry eyes prn Yes Unknown, Entered By History   inFLIXimab (REMICADE IV) Inject into the vein every 2 months Past Week at Unknown time Yes Unknown, Entered By History   MERCAPTOPURINE PO Take 50 mg by mouth At Bedtime  12/15/2019 at Unknown time Yes Reported, Patient   polyethylene glycol (MIRALAX/GLYCOLAX) powder Take 1 capful by mouth daily as needed for constipation  12/15/2019 at Unknown time Yes Reported, Patient   PRINZIDE 20-12.5 MG OR TABS 2 tablets daily 12/16/2019 at Unknown time Yes Reported, Patient   TOPROL XL# 100 MG OR TB24 1 TABLET DAILY 12/16/2019 at Unknown time Yes Reported, Patient   butalbital-acetaminophen-caffeine (FIORICET/ESGIC) -40 MG per tablet Take 1 tablet by mouth every 4 hours as needed prn  Daniel Martínez MD

## 2019-12-16 NOTE — ED NOTES
"[facility]  ED Nurse Handoff Report    ED Chief complaint: Abdominal Pain      ED Diagnosis:   Final diagnoses:   Small bowel obstruction (H)       Code Status: TBD at admission    Allergies:   Allergies   Allergen Reactions     Ibuprofen Other (See Comments)     Told not to take due to bleeing     Innovar [Fentanyl-Droperidol] Difficulty breathing     Heavy chest     Sulfa Drugs Itching       Activity level - Baseline/Home:  Independent  Activity Level - Current:   Independent    Patient's Preferred language: english   Needed?: No    Isolation: No  Infection: Not Applicable  Bariatric?: No    Vital Signs:   Vitals:    12/16/19 1414   BP: (!) 159/77   Pulse: 93   Resp: 18   Temp: 98.1  F (36.7  C)   TempSrc: Oral   SpO2: 96%   Weight: 88.5 kg (195 lb)   Height: 1.651 m (5' 5\")       Cardiac Rhythm: ,        Pain level:      Is this patient confused?: No   Does this patient have a guardian?  No         If yes, is there guardianship documents in the Epic \"Code/ACP\" activity?  N/A         Guardian Notified?  N/A  Taylor - Suicide Severity Rating Scale Completed?  Yes  If yes, what color did the patient score?  White    Patient Report: Initial Complaint: 69 year old female with a history of Crohn's disease (on Humira), malabsorption, and hypertension who presents with abdominal pain. 1.5 weeks ago she was placed on Amoxicillin for a URI/cold. Within two days she began having \"shooting\" diarrhea. Therefore, she was transitioned to a Z-camilo and her diarrhea stopped. Now, she reports intermittent abdominal bloating and nausea with decreased appetite for the last several days. Her pain and bloating are in her upper abdomen. Additionally, she has had decreased bowel movements and had dysuria over the weekend  Focused Assessment:   Respiratory: WDL   Cardiac:WDL   Neuro: WDL   GI: patient has had decreased bowel movements and increased abdominal distention for the last few days. Patient reports " that she had a small light brown BM this morning and feels like this may be a chrons flare up   Tests Performed: labs, abdominal CT  Abnormal Results:   Results for orders placed or performed during the hospital encounter of 12/16/19 (from the past 24 hour(s))   CBC with platelets differential   Result Value Ref Range    WBC 8.8 4.0 - 11.0 10e9/L    RBC Count 4.77 3.8 - 5.2 10e12/L    Hemoglobin 14.3 11.7 - 15.7 g/dL    Hematocrit 43.8 35.0 - 47.0 %    MCV 92 78 - 100 fl    MCH 30.0 26.5 - 33.0 pg    MCHC 32.6 31.5 - 36.5 g/dL    RDW 20.3 (H) 10.0 - 15.0 %    Platelet Count 374 150 - 450 10e9/L    Diff Method Automated Method     % Neutrophils 82.7 %    % Lymphocytes 7.1 %    % Monocytes 7.5 %    % Eosinophils 2.0 %    % Basophils 0.5 %    % Immature Granulocytes 0.2 %    Nucleated RBCs 0 0 /100    Absolute Neutrophil 7.3 1.6 - 8.3 10e9/L    Absolute Lymphocytes 0.6 (L) 0.8 - 5.3 10e9/L    Absolute Monocytes 0.7 0.0 - 1.3 10e9/L    Absolute Eosinophils 0.2 0.0 - 0.7 10e9/L    Absolute Basophils 0.0 0.0 - 0.2 10e9/L    Abs Immature Granulocytes 0.0 0 - 0.4 10e9/L    Absolute Nucleated RBC 0.0    Comprehensive metabolic panel   Result Value Ref Range    Sodium 139 133 - 144 mmol/L    Potassium 3.7 3.4 - 5.3 mmol/L    Chloride 105 94 - 109 mmol/L    Carbon Dioxide 26 20 - 32 mmol/L    Anion Gap 8 3 - 14 mmol/L    Glucose 95 70 - 99 mg/dL    Urea Nitrogen 15 7 - 30 mg/dL    Creatinine 0.69 0.52 - 1.04 mg/dL    GFR Estimate 89 >60 mL/min/[1.73_m2]    GFR Estimate If Black >90 >60 mL/min/[1.73_m2]    Calcium 10.6 (H) 8.5 - 10.1 mg/dL    Bilirubin Total 0.4 0.2 - 1.3 mg/dL    Albumin 4.5 3.4 - 5.0 g/dL    Protein Total 8.5 6.8 - 8.8 g/dL    Alkaline Phosphatase 82 40 - 150 U/L    ALT 38 0 - 50 U/L    AST 22 0 - 45 U/L   Lipase   Result Value Ref Range    Lipase 61 (L) 73 - 393 U/L   UA with Microscopic reflex to Culture   Result Value Ref Range    Color Urine Light Yellow     Appearance Urine Clear     Glucose Urine  Negative NEG^Negative mg/dL    Bilirubin Urine Negative NEG^Negative    Ketones Urine Negative NEG^Negative mg/dL    Specific Gravity Urine 1.003 1.003 - 1.035    Blood Urine Negative NEG^Negative    pH Urine 6.0 5.0 - 7.0 pH    Protein Albumin Urine Negative NEG^Negative mg/dL    Urobilinogen mg/dL Normal 0.0 - 2.0 mg/dL    Nitrite Urine Negative NEG^Negative    Leukocyte Esterase Urine Small (A) NEG^Negative    Source Midstream Urine     WBC Urine 5 0 - 5 /HPF    RBC Urine 2 0 - 2 /HPF    Bacteria Urine Few (A) NEG^Negative /HPF    Squamous Epithelial /HPF Urine <1 0 - 1 /HPF   CT Abdomen Pelvis w Contrast    Narrative    CT ABDOMEN PELVIS WITH CONTRAST   12/16/2019 4:53 PM     HISTORY: Abdominal distension. Epigastric abdominal pain.    TECHNIQUE:  CT abdomen and pelvis with 98 mL Isovue-370 IV. Radiation  dose for this scan was reduced using automated exposure control,  adjustment of the mA and/or kV according to patient size, or iterative  reconstruction technique.    COMPARISON: CT abdomen pelvis on 5/3/2008    FINDINGS:   Lung bases: Are clear.    Abdomen/pelvis: Diffuse hypoattenuation of the liver, likely due to  underlying hepatic steatosis. The gallbladder is unremarkable. No  splenomegaly. No adrenal nodules. No main pancreatic ductal dilatation  or solid pancreatic mass. No radiodense kidney stones or  hydronephrosis. Left parapelvic cysts. No radiodense kidney stones or  hydronephrosis.    Multiple dilated small bowel loops with transition point in the right  upper quadrant (series 4 image [id number]) into nondilated distal loops.  Multiple radiodense objects within the lumen of the bowel of  indeterminate etiology. No significant free fluid in the abdomen or  pelvis. No free peritoneal or portal venous gas. The uterus is not  visualized, likely surgically absent. Colonic, predominantly sigmoid  diverticulosis without CT evidence of acute diverticulitis.    Bones and soft tissues: No suspicious osseous  lesion. Small  fat-containing umbilical hernia.      Impression    IMPRESSION:  1. Multiple dilated small bowel loops with a transition point in the  right lower quadrant, finding consistent with a small-bowel  obstruction.  2. Hepatic steatosis.  3. Colonic diverticulosis without CT evidence of acute diverticulitis.  4. Multiple irregular-shaped radiodense objects within the lumen of  the small bowel, indeterminant, could represent ingested  food/fragments versus less likely medication pills.       Treatments provided: monitoring    Family Comments: none present    OBS brochure/video discussed/provided to patient/family: Yes              Name of person given brochure if not patient:               Relationship to patient:     ED Medications:   Medications   iopamidol (ISOVUE-370) solution 98 mL (98 mLs Intravenous Given 12/16/19 1649)   sodium chloride 0.9 % bag 500mL for CT scan flush use (70 mLs Intravenous Given 12/16/19 1651)       Drips infusing?:  Yes    For the majority of the shift this patient was Green.   Interventions performed were .    Severe Sepsis OR Septic Shock Diagnosis Present: No    To be done/followed up on inpatient unit:  Continue to monitor     ED NURSE PHONE NUMBER: *[phone]

## 2019-12-16 NOTE — ED TRIAGE NOTES
Intermittent abdominal bloating and nausea with decreased appetite for several days. Decreased BM's.

## 2019-12-17 LAB
ANION GAP SERPL CALCULATED.3IONS-SCNC: 6 MMOL/L (ref 3–14)
BUN SERPL-MCNC: 10 MG/DL (ref 7–30)
CALCIUM SERPL-MCNC: 10 MG/DL (ref 8.5–10.1)
CHLORIDE SERPL-SCNC: 112 MMOL/L (ref 94–109)
CO2 SERPL-SCNC: 25 MMOL/L (ref 20–32)
CREAT SERPL-MCNC: 0.65 MG/DL (ref 0.52–1.04)
ERYTHROCYTE [DISTWIDTH] IN BLOOD BY AUTOMATED COUNT: 20.5 % (ref 10–15)
GFR SERPL CREATININE-BSD FRML MDRD: >90 ML/MIN/{1.73_M2}
GLUCOSE SERPL-MCNC: 111 MG/DL (ref 70–99)
HCT VFR BLD AUTO: 41.5 % (ref 35–47)
HGB BLD-MCNC: 13.3 G/DL (ref 11.7–15.7)
MCH RBC QN AUTO: 29.6 PG (ref 26.5–33)
MCHC RBC AUTO-ENTMCNC: 32 G/DL (ref 31.5–36.5)
MCV RBC AUTO: 92 FL (ref 78–100)
PLATELET # BLD AUTO: 367 10E9/L (ref 150–450)
POTASSIUM SERPL-SCNC: 4.1 MMOL/L (ref 3.4–5.3)
RBC # BLD AUTO: 4.5 10E12/L (ref 3.8–5.2)
SODIUM SERPL-SCNC: 143 MMOL/L (ref 133–144)
WBC # BLD AUTO: 10.8 10E9/L (ref 4–11)

## 2019-12-17 PROCEDURE — 99232 SBSQ HOSP IP/OBS MODERATE 35: CPT | Performed by: HOSPITALIST

## 2019-12-17 PROCEDURE — 85027 COMPLETE CBC AUTOMATED: CPT | Performed by: INTERNAL MEDICINE

## 2019-12-17 PROCEDURE — 36415 COLL VENOUS BLD VENIPUNCTURE: CPT | Performed by: INTERNAL MEDICINE

## 2019-12-17 PROCEDURE — 99221 1ST HOSP IP/OBS SF/LOW 40: CPT | Performed by: SURGERY

## 2019-12-17 PROCEDURE — 12000000 ZZH R&B MED SURG/OB

## 2019-12-17 PROCEDURE — 25000132 ZZH RX MED GY IP 250 OP 250 PS 637: Mod: GY | Performed by: HOSPITALIST

## 2019-12-17 PROCEDURE — 80048 BASIC METABOLIC PNL TOTAL CA: CPT | Performed by: INTERNAL MEDICINE

## 2019-12-17 PROCEDURE — 25000132 ZZH RX MED GY IP 250 OP 250 PS 637: Mod: GY | Performed by: INTERNAL MEDICINE

## 2019-12-17 PROCEDURE — 25000128 H RX IP 250 OP 636: Performed by: INTERNAL MEDICINE

## 2019-12-17 RX ORDER — PANTOPRAZOLE SODIUM 40 MG/1
40 TABLET, DELAYED RELEASE ORAL
Status: DISCONTINUED | OUTPATIENT
Start: 2019-12-17 | End: 2019-12-19 | Stop reason: HOSPADM

## 2019-12-17 RX ORDER — BISACODYL 10 MG
10 SUPPOSITORY, RECTAL RECTAL ONCE
Status: COMPLETED | OUTPATIENT
Start: 2019-12-17 | End: 2019-12-17

## 2019-12-17 RX ADMIN — BUTALBITAL, ACETAMINOPHEN, AND CAFFEINE 1 TABLET: 50; 325; 40 TABLET ORAL at 08:20

## 2019-12-17 RX ADMIN — BUTALBITAL, ACETAMINOPHEN, AND CAFFEINE 1 TABLET: 50; 325; 40 TABLET ORAL at 21:25

## 2019-12-17 RX ADMIN — Medication 10 MG: at 10:14

## 2019-12-17 RX ADMIN — METOPROLOL SUCCINATE 100 MG: 100 TABLET, EXTENDED RELEASE ORAL at 08:15

## 2019-12-17 RX ADMIN — BUTALBITAL, ACETAMINOPHEN, AND CAFFEINE 1 TABLET: 50; 325; 40 TABLET ORAL at 13:35

## 2019-12-17 RX ADMIN — POTASSIUM CHLORIDE AND SODIUM CHLORIDE: 900; 150 INJECTION, SOLUTION INTRAVENOUS at 13:23

## 2019-12-17 RX ADMIN — POTASSIUM CHLORIDE AND SODIUM CHLORIDE: 900; 150 INJECTION, SOLUTION INTRAVENOUS at 22:45

## 2019-12-17 RX ADMIN — BUTALBITAL, ACETAMINOPHEN, AND CAFFEINE 1 TABLET: 50; 325; 40 TABLET ORAL at 03:49

## 2019-12-17 RX ADMIN — LISINOPRIL 20 MG: 20 TABLET ORAL at 08:15

## 2019-12-17 RX ADMIN — MERCAPTOPURINE 50 MG: 50 TABLET ORAL at 19:51

## 2019-12-17 RX ADMIN — PANTOPRAZOLE SODIUM 40 MG: 40 TABLET, DELAYED RELEASE ORAL at 08:15

## 2019-12-17 RX ADMIN — POTASSIUM CHLORIDE AND SODIUM CHLORIDE: 900; 150 INJECTION, SOLUTION INTRAVENOUS at 05:10

## 2019-12-17 ASSESSMENT — ACTIVITIES OF DAILY LIVING (ADL)
ADLS_ACUITY_SCORE: 10
ADLS_ACUITY_SCORE: 9
ADLS_ACUITY_SCORE: 10
ADLS_ACUITY_SCORE: 9
ADLS_ACUITY_SCORE: 11
ADLS_ACUITY_SCORE: 9

## 2019-12-17 ASSESSMENT — MIFFLIN-ST. JEOR: SCORE: 1442.15

## 2019-12-17 NOTE — CONSULTS
General Surgery Consultation    Jacklyn Arriola MRN#: [id number]   Age: 69 year old YOB: 1950     Date of Admission:  12/16/2019  Reason for consult: Small bowel obstruction       Requesting physician: Lev Dangelo       Surgeon:        Ernesto Hernandez        Chief Complaint:   Small bowel obstruction     History is obtained from the patient and chart review         History of Present Illness:   General Surgery was asked to evaluate this patient at the request of Dr. Lev Dangelo for small bowel obstruction in patient with Crohn's.    This patient is a 69 year old  female with a significant past medical history of Crohn's disease, anemia, hypertension, migraine headaches with aura who presents with abdominal pain and bloating.  She feels this started back around Thanksgiving.  Was having some URI symptoms at the same time.  Was negative for Influenza.  Placed on high dose amoxicillin which caused moderate diarrhea few days.  She was then switched to a Z-pack.    Her respiratory issues have resolved.  However, around the time this happened, she was feeling more bloated in her abdomen.  Had some nausea and one episode of emesis around Thanksgiving.  Her stools were also changed and she recalls one pasty colorless stool.  Her stools returned to normal until about 4-5 days ago, when she started noting more bloating.  She offers that she still has been able to pass flatus during this time.  When pain was present, it was more upper GI which is abnormal for her; her Crohn's pain is usually low abdominal.    Came to ED because just felt like she was worsening.  She had NGT placed in ED for SBO, but had some amnesia and kept asking same questions, didn't know why she was in the hospital, etc.  CT of head was ordered and the NGT removed as a result.    Today, the patient feels a fullness or abnormal feeling in upper abdomen but cannot discern if this is nausea or hunger.  Her RLQ pain is improved.  She  had a smear of stool that was normal color this am.  Not much else for BM but passing a little flatus.          Past Medical History:   Jacklyn Arriola  has a past medical history of Anemia, Crohn's disease (H), and Hypertension.          Past Surgical History:     Past Surgical History:   Procedure Laterality Date     GYN SURGERY      hysterectomy             Social History:     Social History     Tobacco Use     Smoking status: Never Smoker     Smokeless tobacco: Never Used   Substance Use Topics     Alcohol use: Never     Frequency: Never             Family History:   This patient has no significant family history          Allergies:     Allergies   Allergen Reactions     Ibuprofen Other (See Comments)     Told not to take due to bleeing     Innovar [Fentanyl-Droperidol] Difficulty breathing     Heavy chest     Sulfa Drugs Itching             Medications:     Prior to Admission medications    Medication Sig Start Date End Date Taking? Authorizing Provider   Ferumoxytol (FERAHEME IV) Inject into the vein as needed Every 2 months prn for low Hgb   Yes Unknown, Entered By History   hypromellose-dextran (ARTIFICAL TEARS) 0.1-0.3 % ophthalmic solution Place 1 drop into both eyes 2 times daily as needed for dry eyes   Yes Unknown, Entered By History   inFLIXimab (REMICADE IV) Inject into the vein every 2 months   Yes Unknown, Entered By History   MERCAPTOPURINE PO Take 50 mg by mouth At Bedtime    Yes Reported, Patient   polyethylene glycol (MIRALAX/GLYCOLAX) powder Take 1 capful by mouth daily as needed for constipation    Yes Reported, Patient   PRINZIDE 20-12.5 MG OR TABS 2 tablets daily 1/7/04  Yes Reported, Patient   TOPROL XL# 100 MG OR TB24 1 TABLET DAILY   Yes Reported, Patient   butalbital-acetaminophen-caffeine (FIORICET/ESGIC) -40 MG per tablet Take 1 tablet by mouth every 4 hours as needed 3/7/18   Daniel Martínez MD             Review of Systems:   The Review of Systems is negative other than  "noted in the HPI          Physical Exam:   Blood pressure (!) 147/80, pulse 88, temperature 98.5  F (36.9  C), temperature source Oral, resp. rate 14, height 1.651 m (5' 5\"), weight 91.6 kg (202 lb), SpO2 96 %, not currently breastfeeding.    General - This is a well developed, well nourished female in no apparent distress.  HEENT - Normocephalic. Atraumatic. Moist mucous membranes. Pupils equal.  No scleral icterus.  Neck - Supple without masses.  Lungs - Clear to ascultation bilaterally.    Heart - Regular rate & rhythm without murmur.  Abdomen - Soft, nontender, nondistended with +bowel sounds.   Negative Guillen sign.  No pain at McBurney's   Extremities - Moves all extremities. Warm without edema.  Neurologic - Nonfocal.  Does report some amnesia during NGT placement yesterday, but pt feels back to baseline.   reports this is much better.  CT head performed yesterday          Data:   Labs:  WBC -   WBC   Date Value Ref Range Status   12/17/2019 10.8 4.0 - 11.0 10e9/L Final     Hgb -   Hemoglobin   Date Value Ref Range Status   12/17/2019 13.3 11.7 - 15.7 g/dL Final       Liver Function Studies -   Recent Labs   Lab Test 12/16/19  1538   PROTTOTAL 8.5   ALBUMIN 4.5   BILITOTAL 0.4   ALKPHOS 82   AST 22   ALT 38     Urinalysis: WNL    CT scan of the abdomen:   IMPRESSION:  1. Multiple dilated small bowel loops with a transition point in the  right lower quadrant, finding consistent with a small-bowel  obstruction.  2. Hepatic steatosis.  3. Colonic diverticulosis without CT evidence of acute diverticulitis.  4. Multiple irregular-shaped radiodense objects within the lumen of  the small bowel, indeterminant, could represent ingested  food/fragments versus less likely medication pills.                Assessment:   Small bowel obstruction         Plan:     Seems to be resolving SBO with conservative management  Would be ok starting sips of clears  GI seeing now.  Advance diet per their recommendations  Hold on " NGT replacement now  Would agree with suppository  Surgery will follow along in case she does not tolerate diet.    Godwin Burton PA-C, physician assistant for Ernesto Hernandez  [facility], [phone]  Pager [phone]        WDL

## 2019-12-17 NOTE — H&P
[facility]    History and Physical  Hospitalist       Date of Admission:  12/16/2019    Assessment & Plan     This is a 69-year-old female with history of hypertension, Crohn's disease, iron deficiency anemia, history of endometriosis, status post abdominal hysterectomy and left oophorectomy followed by [facility] BRAUN who comes to the ER with complaint of nausea, bloating and abdominal pain and constipation for the last 6 days.      ASSESSMENT AND PLAN:   1.  Small-bowel obstruction:  This is a 69-year-old female with history of Crohn's disease who presented with abdominal pain, distention, nausea, and constipation and on CT scan found to have dilated small bowel with transition point in the right lower abdomen consistent with small-bowel obstruction.  Either this can be secondary to Crohn's disease and stricture versus secondary to adhesions given her prior abdominal surgery.  At this time, we will admit her, keep her n.p.o., pass NG tube and do low intermittent wall suction, IV fluids, IV Protonix.  We will consult GI and General Surgery to evaluate the patient.  We will see how she does.  If her symptoms resolve, good.  Otherwise, may not need upper GI follow-through.  I will let further management be decided by General Surgery.  Keep her on IV Protonix 40 mg daily as well.   2.  Crohn's disease:  The patient is on infliximab and 6-mercaptopurine daily.  Infliximab she is taking every 8 weeks.  She had a higher dose last week on Wednesday.   3.  Hypertension, well controlled with metoprolol 100 mg and lisinopril 20 and hydrochlorothiazide 12.5 mg daily.  I will continue with metoprolol succinate 100 mg daily and lisinopril 20 mg daily.  Hold the hydrochlorothiazide for now.   4.  History of iron deficiency anemia:  The patient is followed by Hematology,  She is on IV iron supplements every 2 months.  At this time, hemoglobin is stable, may be slightly hemoconcentrated.   5.  Gastrointestinal  prophylaxis with IV Protonix.   6.  Deep venous thrombosis prophylaxis with SCDs.      CODE STATUS:  Full code.      The case was discussed with the ER physician and the nursing staff taking care of the patient.         LEV CASAS MD        Addendum:    During and after passing the NG tube in the ER she has an episode which she don't remember and get confused.   Got a call from ER physician, went back to see her, she was gagging, nauseated, told me that I am confused, talking to her son on the phone, does not remember how she come to the ER and for what. Son told me on the phone she was talking to him normal few minutes back.     On exam she has no neurological deficit, eyes looks some what congested and I think because of gagging. I will remove the NG tube at this time, agree with CT head, most likely had Transient global amnesia. Will keep an eye on it.  Updated the family on the phone. Back to very much normal at this time.        DVT Prophylaxis: Pneumatic Compression Devices  Code Status: Full Code    Disposition: Expected discharge in 2 days once stable.    Lev Casas MD    Primary Care Physician   [facility]    Chief Complaint   Abdominal pain, bloating and nausea.     History is obtained from the patient    History of Present Illness   Admitted:     12/16/2019      HISTORY OF PRESENT ILLNESS:  This is a 69-year-old female with history of hypertension, Crohn's disease, iron deficiency anemia, history of endometriosis, status post abdominal hysterectomy and left oophorectomy followed by [facility] who comes to the ER with complaint of nausea, bloating and abdominal pain and constipation for the last 6 days.      According to the patient, about 6 days ago on Wednesday she started having some abdominal bloating, abdominal distention, abdominal pain.  She had 1 bowel movement and since then is not having much bowel movement since then.  Whenever she ate or drank she started feeling  abdomen was getting more bloated and distended and feels nauseated.  She was intermittently having abdominal pain as well.  Yesterday was the worst, rated 9/10 pain scale yesterday, and today was 5/10.  She was constipated, so she took MiraLax last night and had a small bowel movement this morning which was formed, but very small.  She off and on is passing gas as well.      Because of the symptoms of abdominal bloating, nauseated, abdominal pain and constipation, she did talk to her gastroenterologist at the [facility] and they recommended her to go to the ER to get checked.      The patient denies any fever, chills, headache, dizziness, lightheadedness.  No vomiting, no chest pain, orthopnea, PND, palpitations.  No dysuria, hematuria at this time.  The rest of the review of system is negative.  The patient has no history of small-bowel obstruction in the past.  She is on Remicade and mercaptopurine for her Crohn's disease.  She has been compliant with her low-fiber diet.     Past Medical History    I have reviewed this patient's medical history and updated it with pertinent information if needed.   Past Medical History:   Diagnosis Date     Anemia      Crohn's disease (H)      Hypertension        Past Surgical History   I have reviewed this patient's surgical history and updated it with pertinent information if needed.  Past Surgical History:   Procedure Laterality Date     GYN SURGERY      hysterectomy       Prior to Admission Medications   Prior to Admission Medications   Prescriptions Last Dose Informant Patient Reported? Taking?   Ferumoxytol (FERAHEME IV) Past Month at Unknown time Self Yes Yes   Sig: Inject into the vein as needed Every 2 months prn for low Hgb   MERCAPTOPURINE PO 12/15/2019 at Unknown time Self Yes Yes   Sig: Take 50 mg by mouth At Bedtime    PRINZIDE 20-12.5 MG OR TABS 2019 at Unknown time Self Yes Yes   Si tablets daily   TOPROL XL# 100 MG OR  at Unknown time  Self Yes Yes   Si TABLET DAILY   butalbital-acetaminophen-caffeine (FIORICET/ESGIC) -40 MG per tablet prn Self No No   Sig: Take 1 tablet by mouth every 4 hours as needed   hypromellose-dextran (ARTIFICAL TEARS) 0.1-0.3 % ophthalmic solution prn Self Yes Yes   Sig: Place 1 drop into both eyes 2 times daily as needed for dry eyes   inFLIXimab (REMICADE IV) Past Week at Unknown time Self Yes Yes   Sig: Inject into the vein every 2 months   polyethylene glycol (MIRALAX/GLYCOLAX) powder 12/15/2019 at Unknown time Self Yes Yes   Sig: Take 1 capful by mouth daily as needed for constipation       Facility-Administered Medications: None     Allergies   Allergies   Allergen Reactions     Ibuprofen Other (See Comments)     Told not to take due to bleeing     Innovar [Fentanyl-Droperidol] Difficulty breathing     Heavy chest     Sulfa Drugs Itching       Social History   I have reviewed this patient's social history and updated it with pertinent information if needed. Jacklyn RATLIFF Flako  reports that she has never smoked. She has never used smokeless tobacco. She reports that she does not drink alcohol or use drugs.    Family History   I have reviewed this patient's family history and updated it with pertinent information if needed.   History reviewed. No pertinent family history.    Review of Systems   CONSTITUTIONAL:  negative  EYES:  negative  HEENT:  negative  RESPIRATORY:  negative  CARDIOVASCULAR:  negative  GASTROINTESTINAL:  positive for nausea, change in bowel habits, constipation and abdominal pain  GENITOURINARY:  negative  INTEGUMENT/BREAST:  negative  HEMATOLOGIC/LYMPHATIC:  negative  ALLERGIC/IMMUNOLOGIC:  negative  ENDOCRINE:  negative  MUSCULOSKELETAL:  negative  NEUROLOGICAL:  negative  BEHAVIOR/PSYCH:  negative    Physical Exam   Temp: 98.1  F (36.7  C) Temp src: Oral BP: (!) 159/77 Pulse: 93   Resp: 18 SpO2: 96 % O2 Device: None (Room air)    Vital Signs with Ranges  Temp:  [98.1  F (36.7  C)]  98.1  F (36.7  C)  Pulse:  [93] 93  Resp:  [18] 18  BP: (159)/(77) 159/77  SpO2:  [96 %] 96 %  195 lbs 0 oz    Constitutional: Awake, alert, cooperative, no apparent distress.  Eyes: Conjunctiva and pupils examined and normal.  HEENT: Moist mucous membranes, normal dentition.  Respiratory: Clear to auscultation bilaterally, no crackles or wheezing.  Cardiovascular: Regular rate and rhythm, normal S1 and S2, and no murmur noted.  GI: Soft, mildly distended,  non tender, normal bowel sounds.  Lymph/Hematologic: No anterior cervical or supraclavicular adenopathy.  Skin: No rashes, no cyanosis, no edema.  Musculoskeletal: No joint swelling, erythema or tenderness.  Neurologic: Cranial nerves 2-12 intact, normal strength and sensation.  Psychiatric: Alert, oriented to person, place and time, no obvious anxiety or depression.    Data   Data reviewed today:  I personally reviewed the abdominal CT image(s) showing as reported below .  Recent Labs   Lab 12/16/19  1538   WBC 8.8   HGB 14.3   MCV 92         POTASSIUM 3.7   CHLORIDE 105   CO2 26   BUN 15   CR 0.69   ANIONGAP 8   BARBER 10.6*   GLC 95   ALBUMIN 4.5   PROTTOTAL 8.5   BILITOTAL 0.4   ALKPHOS 82   ALT 38   AST 22   LIPASE 61*       Recent Results (from the past 24 hour(s))   CT Abdomen Pelvis w Contrast    Narrative    CT ABDOMEN PELVIS WITH CONTRAST   12/16/2019 4:53 PM     HISTORY: Abdominal distension. Epigastric abdominal pain.    TECHNIQUE:  CT abdomen and pelvis with 98 mL Isovue-370 IV. Radiation  dose for this scan was reduced using automated exposure control,  adjustment of the mA and/or kV according to patient size, or iterative  reconstruction technique.    COMPARISON: CT abdomen pelvis on 5/3/2008    FINDINGS:   Lung bases: Are clear.    Abdomen/pelvis: Diffuse hypoattenuation of the liver, likely due to  underlying hepatic steatosis. The gallbladder is unremarkable. No  splenomegaly. No adrenal nodules. No main pancreatic ductal  dilatation  or solid pancreatic mass. No radiodense kidney stones or  hydronephrosis. Left parapelvic cysts. No radiodense kidney stones or  hydronephrosis.    Multiple dilated small bowel loops with transition point in the right  upper quadrant (series 4 image [id number]) into nondilated distal loops.  Multiple radiodense objects within the lumen of the bowel of  indeterminate etiology. No significant free fluid in the abdomen or  pelvis. No free peritoneal or portal venous gas. The uterus is not  visualized, likely surgically absent. Colonic, predominantly sigmoid  diverticulosis without CT evidence of acute diverticulitis.    Bones and soft tissues: No suspicious osseous lesion. Small  fat-containing umbilical hernia.      Impression    IMPRESSION:  1. Multiple dilated small bowel loops with a transition point in the  right lower quadrant, finding consistent with a small-bowel  obstruction.  2. Hepatic steatosis.  3. Colonic diverticulosis without CT evidence of acute diverticulitis.  4. Multiple irregular-shaped radiodense objects within the lumen of  the small bowel, indeterminant, could represent ingested  food/fragments versus less likely medication pills.

## 2019-12-17 NOTE — PLAN OF CARE
DATE & TIME: 12/16/2019 9361-9947   Cognitive Concerns/ Orientation : A&O x4- confused.    BEHAVIOR & AGGRESSION TOOL COLOR: Green  CIWA SCORE: NA   ABNL VS/O2: VSS on RA.   MOBILITY: independent   PAIN MANAGMENT: c/o of migraine headache- denies any pain management. Hot pack given to pt.   DIET: NPO  BOWEL/BLADDER: continent of B/B.   ABNL LAB/BG: VSS on RA.   DRAIN/DEVICES: PIV infusing @ 125mL/hr  TELEMETRY RHYTHM: NA  SKIN: intact   TESTS/PROCEDURES: CT head (-), CT abdomen  D/C DAY/GOALS/PLACE: pending progress  OTHER IMPORTANT INFO: pt experienced episode of confusion upon arrival- following retrograde amnesia event in ER. Disoriented to situation, place, and time. Slowly becoming more oriented. GI, Gen surgery following.     Admission    Patient arrives to room 601-01 via cart from ED  Care plan note: see above.    Inpatient nursing criteria listed below were met:    PCD's Documented: Yes  Skin issues/needs documented :Yes  Isolation education started/completed NA  Patient allergies verified with patient: Yes  Verified completion of Page Risk Assessment Tool:  Yes  Verified completion of Guardianship screening tool: Yes  Fall Prevention: Care plan updated, Education given and documented Yes  Care Plan initiated: Yes  Home medications documented in belongings flowsheet: NA  Patient belongings documented in [employer] flowsheet: Yes  Reminder note (belongings/ medications) placed in discharge instructions:Yes  Admission profile/ required documentation complete: Yes  Bedside Report Letter given and explained to patient Yes

## 2019-12-17 NOTE — CONSULTS
GASTROENTEROLOGY CONSULTATION     Jacklyn Arriola   [address] [facility] [id number]   69 year old female   Admission Date/Time: 12/16/2019   Encounter Date: 12/17/2019  Primary Care Provider: [facility]     Referring / Attending Physician: Lev Casas   We were asked to see the patient in consultation by Dr. Casas for evaluation of SBO.     HPI: Jacklyn Arriola is a 69 year old female with a past medical history significant for hypertension, endometriosis status post abdominal hysterectomy and left oophorectomy, ileal Crohn's disease currently maintained on mercaptopurine and infliximab 10 mg/kg every 8 weeks who presented to the emergency department yesterday for evaluation of abdominal pain, nausea and vomiting.  The patient was in her usual state of health and had been doing well from an IBD standpoint until a couple of weeks ago when she developed some lower abdominal discomfort.  She developed an upper respiratory infection and was treated with 2 different antibiotics last week.  Over the weekend she had a decrease in appetite and an increase in abdominal bloating and nausea which increased significantly yesterday.  She contacted her office and she was referred to the emergency department.  In the emergency department she was found to be hemodynamically stable.  CT scan of the abdomen and pelvis was done with contrast which revealed multiple dilated small bowel loops with a transition point in the right lower quadrant, hepatic steatosis and multiple irregular shaped radiodense objects in the lumen of the small bowel and colon.  An NG tube was placed for decompression however the patient developed confusion and amnesia.  A CT of the head was done which was unremarkable and then her cognition returned to baseline.  She was admitted to the floor for further evaluation.  This morning the patient states that she is doing better.  She still does have some abdominal bloating.  She  denies any further nausea or vomiting.  She has passed a little bit of gas but no significant stool.  She does struggle with chronic constipation and does take MiraLAX on a regular basis.  She denies any hematochezia or melena.  In regards to her inflammatory bowel disease the patient previously was on Humira.  She continued to have a long segment of small intestine wall thickening secondary to active Crohn's disease despite the Humira so she was switched to Remicade.  Her most recent CT enterography earlier this year showed resolution of the small bowel wall thickening.  She was found to have a low infliximab level so her dose was increased to 10 mg/kg every 8 weeks just recently.    Past Medical History  Past Medical History:   Diagnosis Date     Anemia      Crohn's disease (H)      Hypertension        Past Surgical History  Past Surgical History:   Procedure Laterality Date     GYN SURGERY      hysterectomy       Family History  Negative for GI malignancy    Social History  Social History     Socioeconomic History     Marital status:      Spouse name: Not on file     Number of children: Not on file     Years of education: Not on file     Highest education level: Not on file   Occupational History     Not on file   Social Needs     Financial resource strain: Not on file     Food insecurity:     Worry: Not on file     Inability: Not on file     Transportation needs:     Medical: Not on file     Non-medical: Not on file   Tobacco Use     Smoking status: Never Smoker     Smokeless tobacco: Never Used   Substance and Sexual Activity     Alcohol use: Never     Frequency: Never     Drug use: Never     Sexual activity: Not on file   Lifestyle     Physical activity:     Days per week: Not on file     Minutes per session: Not on file     Stress: Not on file   Relationships     Social connections:     Talks on phone: Not on file     Gets together: Not on file     Attends Christianity service: Not on file     Active  "member of club or organization: Not on file     Attends meetings of clubs or organizations: Not on file     Relationship status: Not on file     Intimate partner violence:     Fear of current or ex partner: Not on file     Emotionally abused: Not on file     Physically abused: Not on file     Forced sexual activity: Not on file   Other Topics Concern     Parent/sibling w/ CABG, MI or angioplasty before 65F 55M? Not Asked   Social History Narrative     Not on file       Medications  Prior to Admission medications    Medication Sig Start Date End Date Taking? Authorizing Provider   Ferumoxytol (FERAHEME IV) Inject into the vein as needed Every 2 months prn for low Hgb   Yes Unknown, Entered By History   hypromellose-dextran (ARTIFICAL TEARS) 0.1-0.3 % ophthalmic solution Place 1 drop into both eyes 2 times daily as needed for dry eyes   Yes Unknown, Entered By History   inFLIXimab (REMICADE IV) Inject into the vein every 2 months   Yes Unknown, Entered By History   MERCAPTOPURINE PO Take 50 mg by mouth At Bedtime    Yes Reported, Patient   polyethylene glycol (MIRALAX/GLYCOLAX) powder Take 1 capful by mouth daily as needed for constipation    Yes Reported, Patient   PRINZIDE 20-12.5 MG OR TABS 2 tablets daily 1/7/04  Yes Reported, Patient   TOPROL XL# 100 MG OR TB24 1 TABLET DAILY   Yes Reported, Patient   butalbital-acetaminophen-caffeine (FIORICET/ESGIC) -40 MG per tablet Take 1 tablet by mouth every 4 hours as needed 3/7/18   Daniel Martínez MD       Allergies:  Ibuprofen; Innovar [fentanyl-droperidol]; and Sulfa drugs    ROS: A ten point review of systems was obtained and negative other than the symptoms noted above in the HPI.     Physical Exam:   BP (!) 147/80 (BP Location: Right arm)   Pulse 88   Temp 98.5  F (36.9  C) (Oral)   Resp 14   Ht 1.651 m (5' 5\")   Wt 91.6 kg (202 lb)   SpO2 96%   BMI 33.61 kg/m     Constitutional: age appropriate, alert, no acute distress  Cardiovascular: regular rate " and rhythm, no murmurs,rubs or gallops  Respiratory: clear to auscultation bilaterally  Psychiatric: normal pleasant affect  Head: atraumatic, normocephalic  Neck: supple, no thyromegaly  ENT: mucous membranes are moist, no oral lesions are noted  Abdomen: soft, non-tender, mildly distended, hypoactive bowel sound. No masses or hepatosplenomegaly is appreciated. No rebound tenderness or guarding  Neuro: Neurologically intact grossly  Skin: warm, dry, no rashes are noted    ADDITIONAL COMMENTS:   I reviewed the patient's new clinical lab test results.   Recent Labs   Lab Test 12/16/19  1538   WBC 8.8   HGB 14.3   MCV 92         Recent Labs   Lab Test 12/16/19  1538      POTASSIUM 3.7   CHLORIDE 105   CO2 26   BUN 15   CR 0.69   ANIONGAP 8   BARBER 10.6*      Recent Labs   Lab Test 12/16/19  1538   ALBUMIN 4.5   BILITOTAL 0.4   ALT 38   AST 22   ALKPHOS 82   PROTEIN Negative   LIPASE 61*      I reviewed the patient's new imaging results.     Assessment: 69-year-old female with a history of ileal Crohn's disease presenting with abdominal pain nausea and vomiting.  CT on admission suggested a small bowel obstruction in the right lower quadrant.  The patient's Crohn's disease has been well controlled with infliximab and there had been documented resolution of her small bowel wall thickening earlier this year.  This makes a mechanical small bowel obstruction more likely or potentially she could have a some fibrostenotic complication from her Crohn's disease as well.  She appears to be improving with conservative measures and would like to avoid steroids if possible.    Plan:   -Start ice chips   -Can hold on NGT for now however would replace if she has further nausea and vomiting  -Hold on steroids for now but would consider tomorrow if not making any progress  -If the obstruction resolves with conservative measures would plan for outpatient enterography to reassess activity of her Crohn's disease  -Continue IV  fluids and supportive care  -Mechanical DVT prophylaxis   -Follow up with Dr Montelongo after discharge  -SANJAY following    I discussed the patient's findings and plan with Dr. John Woodruff who will also independently see and examine the patient.     Saji Madden PA-C  [facility]  Cell:  [phone] Monday through Friday [id number]  Office: [phone]

## 2019-12-17 NOTE — PROGRESS NOTES
SPIRITUAL HEALTH SERVICES Progress Note  [facility] 66    Initiated visit due to pt request.  Pt described an amnesia-like episode that occurred yesterday when she had a feeding tube inserted.  Pt stated that she could not remember requesting a , but was still open to visit.  Pt has attended [employer] her entire life, and finds meaning in her mary anne.  Pt requested prayer that she would not need a surgery for SBO, stating that she would be trying ice chips to evaluate situation.  SH offered emotional support and prayer.  SH will f/u in 1-2 days per pt request.      Dejuan Savage  Chaplain Resident

## 2019-12-17 NOTE — PROGRESS NOTES
RECEIVING UNIT ED HANDOFF REVIEW    ED Nurse Handoff Report was reviewed by: Fifi Wynne RN on December 16, 2019 at 6:50 PM

## 2019-12-17 NOTE — PLAN OF CARE
DATE & TIME: 12/17/19 4188-8638    Cognitive Concerns/ Orientation : A&O4, no memory issues or amnesia noted this shift   BEHAVIOR & AGGRESSION TOOL COLOR: green  CIWA SCORE: NA   ABNL VS/O2: VSS on RA, BP elevated 147/83  MOBILITY: independent, stable. Up to bathroom, shower this shift  PAIN MANAGMENT: Pain 5/10 from migraine, had Fioricet during x1 this shift  DIET: NPO, OK to have ice chips  BOWEL/BLADDER: continent, up to BR. Very small/smear BM. BS active. Denies tenderness in abd. Soft, upper quadrants taut  ABNL LAB/BG:   DRAIN/DEVICES: PIV infusing NS+20K 125mL  TELEMETRY RHYTHM: NA  SKIN: WDL  TESTS/PROCEDURES: NA  D/C DAY/GOALS/PLACE: pending progress  OTHER IMPORTANT INFO: Migraine today, ongoing from last night after NG placement & removal.

## 2019-12-17 NOTE — PLAN OF CARE
A&Ox4. No issues with memory this shift other than brief amnesia. Hypoactive bowel sounds, one medium brown stool after suppository was given, passing flatus. Abdomen is non-tender. Tolerating sips of water and ice chips. Fioricet given twice for her migraine. Ambulating in the halls independently.

## 2019-12-17 NOTE — PLAN OF CARE
DATE & TIME: 12/16/19 7347-2355    COGNITION/BEHAVIOR: A&O, though forget with transient amnesia; cannot recall events in ED surrounding NG tube placement nor heart subsequent head CT and has repetitive questions about this.  Vital signs:  Temp: 98.3  F (36.8  C) Temp src: Oral BP: (!) 153/99 Pulse: 88 Heart Rate: 87 Resp: 16 SpO2: 94 % via RA  MOBILITY: SBA  PAIN: Ongoing migraine with aura; Fioricet x2 with some relief  DIET: NPO  RESP: LS clear  CV/PV: HRRR  GI/: Abd obese with hypoactive bowel sounds.  Soft/nontender.  Some mild discomfort with deep palpation.  Now passing flatus, no bowel movement.  Voiding adequately.  SKIN: Pale, intact  LINES: PIV infusing fluids  LAB/BG: Awaiting AM magnesium check for replacement protocol

## 2019-12-17 NOTE — H&P
Admitted:     12/16/2019      HISTORY OF PRESENT ILLNESS:  This is a 69-year-old female with history of hypertension, Crohn's disease, iron deficiency anemia, history of endometriosis, status post abdominal hysterectomy and left oophorectomy followed by [facility] who comes to the ER with complaint of nausea, bloating and abdominal pain and constipation for the last 6 days.      According to the patient, about 6 days ago on Wednesday she started having some abdominal bloating, abdominal distention, abdominal pain.  She had 1 bowel movement and since then is not having much bowel movement since then.  Whenever she ate or drank she started feeling abdomen was getting more bloated and distended and feels nauseated.  She was intermittently having abdominal pain as well.  Yesterday was the worst, rated 9/10 pain scale yesterday, and today was 5/10.  She was constipated, so she took MiraLax last night and had a small bowel movement this morning which was formed, but very small.  She off and on is passing gas as well.      Because of the symptoms of abdominal bloating, nauseated, abdominal pain and constipation, she did talk to her gastroenterologist at the [facility] and they recommended her to go to the ER to get checked.      The patient denies any fever, chills, headache, dizziness, lightheadedness.  No vomiting, no chest pain, orthopnea, PND, palpitations.  No dysuria, hematuria at this time.  The rest of the review of system is negative.  The patient has no history of small-bowel obstruction in the past.  She is on Remicade and mercaptopurine for her Crohn's disease.  She has been compliant with her low-fiber diet.      ASSESSMENT AND PLAN:   1.  Small-bowel obstruction:  This is a 69-year-old female with history of Crohn's disease who presented with abdominal pain, distention, nausea, and constipation and on CT scan found to have dilated small bowel with transition point in the right lower abdomen consistent  with small-bowel obstruction.  Either this can be secondary to Crohn's disease and stricture versus secondary to adhesions given her prior abdominal surgery.  At this time, we will admit her, keep her n.p.o., pass NG tube and do low intermittent wall suction, IV fluids, IV Protonix.  We will consult GI and General Surgery to evaluate the patient.  We will see how she does.  If her symptoms resolve, good.  Otherwise, may not need upper GI follow-through.  I will let further management be decided by General Surgery.  Keep her on IV Protonix 40 mg daily as well.   2.  Crohn's disease:  The patient is on infliximab and 6-mercaptopurine daily.  Infliximab she is taking every 8 weeks.  She had a higher dose last week on Wednesday.   3.  Hypertension, well controlled with metoprolol 100 mg and lisinopril 20 and hydrochlorothiazide 12.5 mg daily.  I will continue with metoprolol succinate 100 mg daily and lisinopril 20 mg daily.  Hold the hydrochlorothiazide for now.   4.  History of iron deficiency anemia:  The patient is followed by Hematology,  She is on IV iron supplements every 2 months.  At this time, hemoglobin is stable, may be slightly hemoconcentrated.   5.  Gastrointestinal prophylaxis with IV Protonix.   6.  Deep venous thrombosis prophylaxis with SCDs.      CODE STATUS:  Full code.      The case was discussed with the ER physician and the nursing staff taking care of the patient.         LAILA WILLIAMSON MD        Addendum:    During and after passing the NG tube in the ER she has an episode which she don't remember and get confused.   Got a call from ER physician, went back to see her, she was gagging, nauseated, told me that I am confused, talking to her son on the phone, does not remember how she come to the ER and for what. Son told me on the phone she was talking to him normal few minutes back.     On exam she has no neurological deficit, eyes looks some what congested and I think because of gagging. I will  remove the NG tube at this time, agree with CT head, most likely had Transient global amnesia. Will keep an eye on it.   Updated the family on the phone.            D: 2019   T: 2019   MT: ARNOLDO      Name:     COY MOREIRA   MRN:      [id number]        Account:      [id number]   :      1950        Admitted:     2019                   Document: [id number]

## 2019-12-17 NOTE — PROGRESS NOTES
[facility]    Hospitalist Progress Note    Assessment & Plan   This is a 69-year-old female with history of hypertension, Crohn's disease, iron deficiency anemia, history of endometriosis, status post abdominal hysterectomy and left oophorectomy followed by [facility] who comes to the ER with complaint of nausea, bloating and abdominal pain and constipation for the last 6 days.   Admitted for SBO.     ASSESSMENT AND PLAN:   SBO: asymptomatic. Small BM this am. Passing gas. No NGT in place.  - Suppository x1 ordered.  - Cont NPO status/IVF.  - Surgery/GI to see.    Transient amnesia: patient had brief period of confusion while NG was being inserted in the ED (was withdrawn at that point). She does not remember it. CT was negative. Reports recent negative MRI of the brain. Neg neuro exam.  - Monitor for now.      Crohn's disease:  The patient is on infliximab and 6-mercaptopurine daily.  Infliximab she is taking every 8 weeks.  She had a higher dose last week on Wednesday.   - Cont PTA meds.  - GI to see.    Hypertension, on metoprolol 100 mg and lisinopril 20 and hydrochlorothiazide 12.5 mg daily PTA.  I   - Continue with metoprolol succinate 100 mg daily and lisinopril 20 mg daily.   - Hold the hydrochlorothiazide for now.     Gastrointestinal prophylaxis with IV Protonix.   Deep venous thrombosis prophylaxis with SCDs.   .  Disp: upon resolution of SBO (1-3 days).    Page Campos MD      Interval History   No abd pain/n/v. Passing gas. Had small BM this am.     Physical Exam   Temp: 98.5  F (36.9  C) Temp src: Oral BP: (!) 147/80 Pulse: 88 Heart Rate: 76 Resp: 14 SpO2: 96 % O2 Device: None (Room air)    Vitals:    12/16/19 1414 12/16/19 2100 12/17/19 0635   Weight: 88.5 kg (195 lb) 90.9 kg (200 lb 6.4 oz) 91.6 kg (202 lb)     Vital Signs with Ranges  Temp:  [97.8  F (36.6  C)-98.7  F (37.1  C)] 98.5  F (36.9  C)  Pulse:  [88-93] 88  Heart Rate:  [76-87] 76  Resp:  [14-18] 14  BP:  (147-159)/(77-99) 147/80  SpO2:  [94 %-97 %] 96 %  I/O last 3 completed shifts:  In: 328 [I.V.:328]  Out: -     Constitutional:Awake, alert, cooperative, no apparent distress  Respiratory: Clear to auscultation bilaterally, no crackles or wheezing  Cardiovascular: Regular rate and rhythm, normal S1 and S2, and no murmur noted  GI: Normal bowel sounds, soft,distended, non-tender  Skin/Integumen: No rashes, no cyanosis, no edema  Other:     Medications     0.9% sodium chloride + KCl 20 mEq/L 125 mL/hr at 12/17/19 0510       bisacodyl  10 mg Rectal Once     lisinopril  20 mg Oral Daily     mercaptopurine  50 mg Oral At Bedtime     metoprolol succinate ER  100 mg Oral Daily     pantoprazole  40 mg Oral QAM AC     sodium chloride (PF)  3 mL Intracatheter Q8H       Data   Recent Labs   Lab 12/17/19  0844 12/16/19  1538   WBC 10.8 8.8   HGB 13.3 14.3   MCV 92 92    374   NA  --  139   POTASSIUM  --  3.7   CHLORIDE  --  105   CO2  --  26   BUN  --  15   CR  --  0.69   ANIONGAP  --  8   BARBER  --  10.6*   GLC  --  95   ALBUMIN  --  4.5   PROTTOTAL  --  8.5   BILITOTAL  --  0.4   ALKPHOS  --  82   ALT  --  38   AST  --  22   LIPASE  --  61*       Recent Results (from the past 24 hour(s))   CT Abdomen Pelvis w Contrast    Narrative    CT ABDOMEN PELVIS WITH CONTRAST   12/16/2019 4:53 PM     HISTORY: Abdominal distension. Epigastric abdominal pain.    TECHNIQUE:  CT abdomen and pelvis with 98 mL Isovue-370 IV. Radiation  dose for this scan was reduced using automated exposure control,  adjustment of the mA and/or kV according to patient size, or iterative  reconstruction technique.    COMPARISON: CT abdomen pelvis on 5/3/2008    FINDINGS:   Lung bases: Are clear.    Abdomen/pelvis: Diffuse hypoattenuation of the liver, likely due to  underlying hepatic steatosis. The gallbladder is unremarkable. No  splenomegaly. No adrenal nodules. No main pancreatic ductal dilatation  or solid pancreatic mass. No radiodense kidney stones  or  hydronephrosis. Left parapelvic cysts. No radiodense kidney stones or  hydronephrosis.    Multiple dilated small bowel loops with transition point in the right  upper quadrant (series 4 image [id number]) into nondilated distal loops.  Multiple radiodense objects within the lumen of the bowel of  indeterminate etiology. No significant free fluid in the abdomen or  pelvis. No free peritoneal or portal venous gas. The uterus is not  visualized, likely surgically absent. Colonic, predominantly sigmoid  diverticulosis without CT evidence of acute diverticulitis.    Bones and soft tissues: No suspicious osseous lesion. Small  fat-containing umbilical hernia.      Impression    IMPRESSION:  1. Multiple dilated small bowel loops with a transition point in the  right lower quadrant, finding consistent with a small-bowel  obstruction.  2. Hepatic steatosis.  3. Colonic diverticulosis without CT evidence of acute diverticulitis.  4. Multiple irregular-shaped radiodense objects within the lumen of  the small bowel, indeterminant, could represent ingested  food/fragments versus less likely medication pills.    WILLIAM WILKINS MD   Head CT w/o contrast    Narrative    CT SCAN OF THE HEAD WITHOUT CONTRAST   12/16/2019 8:20 PM     HISTORY: Altered level of consciousness (LOC), altered mental status,  unexplained.    TECHNIQUE:  Axial images of the head and coronal reformations without  IV contrast material. Radiation dose for this scan was reduced using  automated exposure control, adjustment of the mA and/or kV according  to patient size, or iterative reconstruction technique.    COMPARISON: Head CT 8/28/2009.    FINDINGS:  Mild parenchymal volume loss is present. Cerebellar tonsils  are low-lying at the level of the foramen magnum, right lower than  left, incompletely characterized, probably unchanged. The cerebral  hemispheres, brainstem, and cerebellum otherwise demonstrate normal  morphology and signal. No evidence of acute  ischemia, hemorrhage,  mass, mass effect, or hydrocephalus. The visualized calvarium,  tympanic cavities, mastoid cavities, and paranasal sinuses are  unremarkable.      Impression    IMPRESSION: No acute intracranial abnormality.    JUAN ALBERTO KELLY MD

## 2019-12-18 LAB
ANION GAP SERPL CALCULATED.3IONS-SCNC: 2 MMOL/L (ref 3–14)
BUN SERPL-MCNC: 9 MG/DL (ref 7–30)
CALCIUM SERPL-MCNC: 9.6 MG/DL (ref 8.5–10.1)
CHLORIDE SERPL-SCNC: 110 MMOL/L (ref 94–109)
CO2 SERPL-SCNC: 27 MMOL/L (ref 20–32)
CREAT SERPL-MCNC: 0.71 MG/DL (ref 0.52–1.04)
ERYTHROCYTE [DISTWIDTH] IN BLOOD BY AUTOMATED COUNT: 20.6 % (ref 10–15)
GFR SERPL CREATININE-BSD FRML MDRD: 87 ML/MIN/{1.73_M2}
GLUCOSE SERPL-MCNC: 81 MG/DL (ref 70–99)
HCT VFR BLD AUTO: 40 % (ref 35–47)
HGB BLD-MCNC: 12.6 G/DL (ref 11.7–15.7)
MAGNESIUM SERPL-MCNC: 2.1 MG/DL (ref 1.6–2.3)
MCH RBC QN AUTO: 29.6 PG (ref 26.5–33)
MCHC RBC AUTO-ENTMCNC: 31.5 G/DL (ref 31.5–36.5)
MCV RBC AUTO: 94 FL (ref 78–100)
PLATELET # BLD AUTO: 337 10E9/L (ref 150–450)
POTASSIUM SERPL-SCNC: 4 MMOL/L (ref 3.4–5.3)
RBC # BLD AUTO: 4.26 10E12/L (ref 3.8–5.2)
SODIUM SERPL-SCNC: 139 MMOL/L (ref 133–144)
WBC # BLD AUTO: 7.5 10E9/L (ref 4–11)

## 2019-12-18 PROCEDURE — 36415 COLL VENOUS BLD VENIPUNCTURE: CPT | Performed by: HOSPITALIST

## 2019-12-18 PROCEDURE — 80048 BASIC METABOLIC PNL TOTAL CA: CPT | Performed by: HOSPITALIST

## 2019-12-18 PROCEDURE — 25000132 ZZH RX MED GY IP 250 OP 250 PS 637: Mod: GY | Performed by: PHYSICIAN ASSISTANT

## 2019-12-18 PROCEDURE — 83735 ASSAY OF MAGNESIUM: CPT | Performed by: HOSPITALIST

## 2019-12-18 PROCEDURE — 25000132 ZZH RX MED GY IP 250 OP 250 PS 637: Mod: GY | Performed by: INTERNAL MEDICINE

## 2019-12-18 PROCEDURE — 85027 COMPLETE CBC AUTOMATED: CPT | Performed by: HOSPITALIST

## 2019-12-18 PROCEDURE — 99231 SBSQ HOSP IP/OBS SF/LOW 25: CPT | Performed by: PHYSICIAN ASSISTANT

## 2019-12-18 PROCEDURE — 99232 SBSQ HOSP IP/OBS MODERATE 35: CPT | Performed by: INTERNAL MEDICINE

## 2019-12-18 PROCEDURE — 25000128 H RX IP 250 OP 636: Performed by: INTERNAL MEDICINE

## 2019-12-18 PROCEDURE — 12000000 ZZH R&B MED SURG/OB

## 2019-12-18 RX ORDER — AMOXICILLIN 250 MG
1 CAPSULE ORAL 2 TIMES DAILY
Status: DISCONTINUED | OUTPATIENT
Start: 2019-12-18 | End: 2019-12-19 | Stop reason: HOSPADM

## 2019-12-18 RX ORDER — BISACODYL 10 MG
10 SUPPOSITORY, RECTAL RECTAL ONCE
Status: COMPLETED | OUTPATIENT
Start: 2019-12-18 | End: 2019-12-18

## 2019-12-18 RX ADMIN — POTASSIUM CHLORIDE AND SODIUM CHLORIDE: 900; 150 INJECTION, SOLUTION INTRAVENOUS at 06:47

## 2019-12-18 RX ADMIN — BUTALBITAL, ACETAMINOPHEN, AND CAFFEINE 1 TABLET: 50; 325; 40 TABLET ORAL at 21:03

## 2019-12-18 RX ADMIN — LISINOPRIL 20 MG: 20 TABLET ORAL at 08:36

## 2019-12-18 RX ADMIN — MERCAPTOPURINE 50 MG: 50 TABLET ORAL at 21:03

## 2019-12-18 RX ADMIN — SENNOSIDES AND DOCUSATE SODIUM 1 TABLET: 8.6; 5 TABLET ORAL at 13:46

## 2019-12-18 RX ADMIN — METOPROLOL SUCCINATE 100 MG: 100 TABLET, EXTENDED RELEASE ORAL at 08:36

## 2019-12-18 RX ADMIN — PANTOPRAZOLE SODIUM 40 MG: 40 TABLET, DELAYED RELEASE ORAL at 08:36

## 2019-12-18 RX ADMIN — POTASSIUM CHLORIDE AND SODIUM CHLORIDE: 900; 150 INJECTION, SOLUTION INTRAVENOUS at 15:52

## 2019-12-18 RX ADMIN — Medication 10 MG: at 11:09

## 2019-12-18 ASSESSMENT — ACTIVITIES OF DAILY LIVING (ADL)
ADLS_ACUITY_SCORE: 9
ADLS_ACUITY_SCORE: 8
ADLS_ACUITY_SCORE: 8
ADLS_ACUITY_SCORE: 9
ADLS_ACUITY_SCORE: 9
ADLS_ACUITY_SCORE: 8

## 2019-12-18 NOTE — PROGRESS NOTES
[facility]    Hospitalist Progress Note    Assessment & Plan   This is a 69-year-old female with history of hypertension, Crohn's disease, iron deficiency anemia, history of endometriosis, status post abdominal hysterectomy and left oophorectomy followed by [facility] who comes to the ER with complaint of nausea, bloating and abdominal pain and constipation for the last 6 days.   Admitted for SBO.     ASSESSMENT AND PLAN:   SBO: asymptomatic. Small BM yesterday,. Passing gas. No NGT in place.  On clear liquid diet at this time, general surgery and GI following, will  See how she does with clear liquid then advance the diet slowly, if she tolerate  Then possible discharge in AM.     Transient amnesia: patient had brief period of confusion while NG was being inserted in the ED (was withdrawn at that point). She does not remember it. CT was negative. Reports recent negative MRI of the brain. Neg neuro exam.  - stable at this time. Resolved.       Crohn's disease:  The patient is on infliximab and 6-mercaptopurine daily.  Infliximab she is taking every 8 weeks.  She had a higher dose last week on Wednesday.   - Cont PTA meds.  - GI to see.    Hypertension, on metoprolol 100 mg and lisinopril 20 and hydrochlorothiazide 12.5 mg daily PTA.  I   - Continue with metoprolol succinate 100 mg daily and lisinopril 20 mg daily.   - Hold the hydrochlorothiazide for now.     Gastrointestinal prophylaxis with IV Protonix.   Deep venous thrombosis prophylaxis with SCDs.   .  Disp: upon resolution of SBO (1-3 days).    Lev Casas MD      Interval History   Feeling better, not bloated any more, no BM since yesterday, passing gas. Denies any fever, chills, chest pain, SOB, headache or dizziness at this time.     No other significant event overnight.     Physical Exam   Temp: 98.7  F (37.1  C) Temp src: Axillary BP: (!) 157/78   Heart Rate: 68 Resp: 16 SpO2: 97 % O2 Device: None (Room air)    Vitals:    12/16/19  1414 12/16/19 2100 12/17/19 0635   Weight: 88.5 kg (195 lb) 90.9 kg (200 lb 6.4 oz) 91.6 kg (202 lb)     Vital Signs with Ranges  Temp:  [97.4  F (36.3  C)-98.7  F (37.1  C)] 98.7  F (37.1  C)  Heart Rate:  [65-78] 68  Resp:  [16] 16  BP: (143-158)/(78-83) 157/78  SpO2:  [95 %-97 %] 97 %  I/O last 3 completed shifts:  In: 2712 [I.V.:2712]  Out: -     Constitutional:Awake, alert, cooperative, no apparent distress  Respiratory: Clear to auscultation bilaterally, no crackles or wheezing  Cardiovascular: Regular rate and rhythm, normal S1 and S2, and no murmur noted  GI: Normal bowel sounds, soft,distended, non-tender  Skin/Integumen: No rashes, no cyanosis, no edema  Other:     Medications     0.9% sodium chloride + KCl 20 mEq/L 125 mL/hr at 12/18/19 0647       lisinopril  20 mg Oral Daily     mercaptopurine  50 mg Oral At Bedtime     metoprolol succinate ER  100 mg Oral Daily     pantoprazole  40 mg Oral QAM AC     [START ON 12/19/2019] prune juice  165 mL Oral Daily     senna-docusate  1 tablet Oral BID     sodium chloride (PF)  3 mL Intracatheter Q8H       Data   Recent Labs   Lab 12/18/19  0849 12/17/19  0844 12/16/19  1538   WBC 7.5 10.8 8.8   HGB 12.6 13.3 14.3   MCV 94 92 92    367 374    143 139   POTASSIUM 4.0 4.1 3.7   CHLORIDE 110* 112* 105   CO2 27 25 26   BUN 9 10 15   CR 0.71 0.65 0.69   ANIONGAP 2* 6 8   BARBER 9.6 10.0 10.6*   GLC 81 111* 95   ALBUMIN  --   --  4.5   PROTTOTAL  --   --  8.5   BILITOTAL  --   --  0.4   ALKPHOS  --   --  82   ALT  --   --  38   AST  --   --  22   LIPASE  --   --  61*       No results found for this or any previous visit (from the past 24 hour(s)).

## 2019-12-18 NOTE — PROGRESS NOTES
"General Surgery Progress Note    Assessment and Plan:  69 year old female with SBO, resolving  - Ok to advance diet, small frequent meals  - Appreciate GI input  - Bowel regimen- senna, dulcolax supp and prune juice  - Med mngt per hospitalist, appreciate your help  - Continue conservative measures  - Home when able to tolerate food, remains pain free    Interval Hx:   Denies abdominal pain, n/v, tolerating clears, +Flatus/BM, urinating without difficulty, ambulating.  Meds reviewed.    Exam:  Vitals: Blood pressure (!) 157/78, pulse 88, temperature 98.7  F (37.1  C), temperature source Axillary, resp. rate 16, height 1.651 m (5' 5\"), weight 91.6 kg (202 lb), SpO2 97 %, not currently breastfeeding.  Temperature Temp  Av  F (36.7  C)  Min: 97.4  F (36.3  C)  Max: 98.7  F (37.1  C)   I/O last 3 completed shifts:  In: 2712 [I.V.:2712]  Out: -     Gen: Alert, awake and oriented  Abd: soft, nt, nd, +BS.   Ext: No edema or calf tenderness. +SCDs.    Data:    Recent Labs   Lab Test 19  0849 19  0844 19  1538   WBC 7.5 10.8 8.8   HGB 12.6 13.3 14.3   HCT 40.0 41.5 43.8    367 374      Recent Labs   Lab Test 19  0849 19  0844 19  1538    143 139   POTASSIUM 4.0 4.1 3.7   CHLORIDE 110* 112* 105   CO2 27 25 26   BUN 9 10 15   CR 0.71 0.65 0.69       Emily Yarbrough PA-C  [facility]  [phone]  "

## 2019-12-18 NOTE — PROGRESS NOTES
[facility] Progress Note  [facility] 66    Initiated f/u visit due to pt request.  Pt stated that she had made some progress, stating that she had been able to eat a bit of jello.  Pt stated that she was hoping to be discharged today, and that she would not need surgery.  SH offered prayer that pt would continue to make progress and not need surgery.  SH will f/u per pt request.      Dejuan Savage  Chaplain Resident

## 2019-12-18 NOTE — PROGRESS NOTES
"GASTROENTEROLOGY PROGRESS NOTE     SUBJECTIVE: Feeling better. Less distended. Denies abdominal pain. One slight bout of nausea overnight but no emesis. Tolerating clears     OBJECTIVE:   BP (!) 157/78 (BP Location: Right arm)   Pulse 88   Temp 98.7  F (37.1  C) (Axillary)   Resp 16   Ht 1.651 m (5' 5\")   Wt 91.6 kg (202 lb)   SpO2 97%   BMI 33.61 kg/m     Temp (24hrs), Av  F (36.7  C), Min:97.4  F (36.3  C), Max:98.7  F (37.1  C)     Patient Vitals for the past 72 hrs:   Weight   19 0635 91.6 kg (202 lb)   19 2100 90.9 kg (200 lb 6.4 oz)   19 1414 88.5 kg (195 lb)        Intake/Output Summary (Last 24 hours) at 2019 1002  Last data filed at 2019 2246  Gross per 24 hour   Intake 2712 ml   Output --   Net 2712 ml      PHYSICAL EXAM   Constitutional: Age appropriate, in bed, no acute distress  Cardiovascular: Regular rate and rhythm. No murmurs rubs or gallops  Respiratory: Clear to auscultation bilaterally  Abdomen: Soft, non-tender, non-distended, hypoactive bowel sounds. No masses or hepatosplenomegaly appreciated. No guarding or rebound tenderness.    Additional Comments:   ROS, FH, SH: See initial GI consult for details.     I have reviewed the patient's new clinical lab results:   Recent Labs   Lab Test 19  0849 19  0844 19  1538   WBC 7.5 10.8 8.8   HGB 12.6 13.3 14.3   MCV 94 92 92    367 374      Recent Labs   Lab Test 19  0849 19  0844 19  1538    143 139   POTASSIUM 4.0 4.1 3.7   CHLORIDE 110* 112* 105   CO2 27 25 26   BUN 9 10 15   CR 0.71 0.65 0.69   ANIONGAP 2* 6 8   BARBER 9.6 10.0 10.6*      Recent Labs   Lab Test 19  1538   ALBUMIN 4.5   BILITOTAL 0.4   ALT 38   AST 22   ALKPHOS 82   PROTEIN Negative   LIPASE 61*     19 CT abdomen pelvis  IMPRESSION:  1. Multiple dilated small bowel loops with a transition point in the  right lower quadrant, finding consistent with a small-bowel  obstruction.  2. Hepatic " steatosis.  3. Colonic diverticulosis without CT evidence of acute diverticulitis.  4. Multiple irregular-shaped radiodense objects within the lumen of  the small bowel, indeterminant, could represent ingested  food/fragments versus less likely medication pills.     Assessment: 69-year-old female with a history of ileal Crohn's disease presenting with abdominal pain nausea and vomiting.  CT on admission suggested a small bowel obstruction in the right lower quadrant.  The patient's Crohn's disease has been well controlled with infliximab and there had been documented resolution of her small bowel wall thickening earlier this year.  This makes a mechanical small bowel obstruction more likely or potentially she could have a some fibrostenotic complication from her Crohn's disease as well.  Pt did not tolerate NGT. Symptoms appear to be improving with conservative measures    Plan:   -Clear liquid diet, advance as tolerated  -Can hold on NGT for now however would replace if she has further nausea and vomiting  -Hold on steroids for now but would consider if she appears to take a step back  -If the obstruction resolves with conservative measures would plan for outpatient enterography to reassess activity of her Crohn's disease  -Continue IV fluids and supportive care  -Mechanical DVT prophylaxis   -Follow up with Dr Montelongo after discharge  -If she is able to tolerate a diet without symptoms and bowel movements restart could consider discharge planning for later tonight vs tomorrow  -SANJAY following    [facility]  Cell:  [phone] Monday through Friday 3275-3716  Office: [phone]

## 2019-12-18 NOTE — PLAN OF CARE
DATE & TIME: 12/18/19 0684-1592                         Cognitive Concerns/ Orientation : A&O x4, no memory issues or amnesia noted this shift   BEHAVIOR & AGGRESSION TOOL COLOR: green  CIWA SCORE: NA    ABNL VS/O2: VSS on RA, BP elevated 158/82  MOBILITY: Independent, steady gait  PAIN MANAGMENT: Denied pain this shift.   DIET: NPO, OK to have ice chips. Would like to advance, has an appetite.   BOWEL/BLADDER: continent, up to BR. Very small/smear BM. BS active. Denies tenderness in abd. Soft, upper quadrants taut  ABNL LAB/BG:   DRAIN/DEVICES: PIV infusing NS+20K 125mL  TELEMETRY RHYTHM: NA  SKIN: WDL  TESTS/PROCEDURES: NA  D/C DAY/GOALS/PLACE: pending progress  OTHER IMPORTANT INFO: Migraine yesterday. PRN med avail, requires co-sign.

## 2019-12-18 NOTE — PROGRESS NOTES
MD Notification    Notified Person: MD    Notified Person Name: Dr. Campos    Notification Date/Time: 12/17/19 1800    Notification Interaction: text page    Purpose of Notification: Pt tolerating water sips and ice chips, can she advance to clears?    Orders Received: Keep NPO with ice chips until GI sees her    Comments:

## 2019-12-18 NOTE — PLAN OF CARE
A&Ox4, calm and coop. Up ind in room, amb in araiza often. VSS. Annabelle clear liq diet. Very small BM per pt report, pebble-sized and one loose stool, prune juice drank, rectal supp and senna tab given, surg following. IVF's.

## 2019-12-19 VITALS
HEIGHT: 65 IN | HEART RATE: 58 BPM | DIASTOLIC BLOOD PRESSURE: 81 MMHG | OXYGEN SATURATION: 100 % | RESPIRATION RATE: 16 BRPM | TEMPERATURE: 98.1 F | SYSTOLIC BLOOD PRESSURE: 151 MMHG | WEIGHT: 199.2 LBS | BODY MASS INDEX: 33.19 KG/M2

## 2019-12-19 PROCEDURE — 99207 ZZC MOONLIGHTING INDICATOR: CPT | Performed by: INTERNAL MEDICINE

## 2019-12-19 PROCEDURE — 99231 SBSQ HOSP IP/OBS SF/LOW 25: CPT | Performed by: PHYSICIAN ASSISTANT

## 2019-12-19 PROCEDURE — 99238 HOSP IP/OBS DSCHRG MGMT 30/<: CPT | Performed by: INTERNAL MEDICINE

## 2019-12-19 PROCEDURE — 25000132 ZZH RX MED GY IP 250 OP 250 PS 637: Mod: GY | Performed by: INTERNAL MEDICINE

## 2019-12-19 RX ORDER — PANTOPRAZOLE SODIUM 40 MG/1
40 TABLET, DELAYED RELEASE ORAL
Qty: 30 TABLET | Refills: 0 | Status: SHIPPED | OUTPATIENT
Start: 2019-12-20 | End: 2021-05-25

## 2019-12-19 RX ORDER — AMOXICILLIN 250 MG
1 CAPSULE ORAL 2 TIMES DAILY
Qty: 60 TABLET | Refills: 0 | Status: SHIPPED | OUTPATIENT
Start: 2019-12-19 | End: 2021-05-25

## 2019-12-19 RX ADMIN — PANTOPRAZOLE SODIUM 40 MG: 40 TABLET, DELAYED RELEASE ORAL at 07:59

## 2019-12-19 RX ADMIN — LISINOPRIL 20 MG: 20 TABLET ORAL at 08:01

## 2019-12-19 RX ADMIN — METOPROLOL SUCCINATE 100 MG: 100 TABLET, EXTENDED RELEASE ORAL at 08:01

## 2019-12-19 ASSESSMENT — ACTIVITIES OF DAILY LIVING (ADL)
ADLS_ACUITY_SCORE: 8

## 2019-12-19 ASSESSMENT — MIFFLIN-ST. JEOR: SCORE: 1429.45

## 2019-12-19 NOTE — DISCHARGE SUMMARY
"[facility]    Discharge Summary  Hospitalist    Date of Admission:  12/16/2019  Date of Discharge:  12/19/2019  Discharging Provider: Shital Monahna MD    Discharge Diagnoses   1. Small bowel obstruction  2.Transient amnesia  3. Crohn's disease  4. Hypertension    Chief Complaint: Abdominal Pain  History of Present Illness   Jacklyn Arriola is a 69 year old female with a history of Crohn's disease (on Humira), malabsorption, and hypertension who presents with abdominal pain. 1.5 weeks ago she was placed on Amoxicillin for a URI/cold. This was 1000mg BID and within two days she began having \"shooting\" diarrhea. Therefore, she was transitioned to a Z-camilo and her diarrhea stopped. However, since then, she reports intermittent abdominal bloating and nausea with decreased appetite for the last several days. Her pain and bloating are in her upper abdomen, which is normal for her Crohn's. Additionally, she has had decreased bowel movements and had dysuria over the weekend, for which she used Azo. She denies fever.    Hospital Course   Jacklyn Arriola was admitted on 12/16/2019.  The following problems were addressed during her hospitalization:  Jacklyn Arriola is a 69-year-old female with history of hypertension, Crohn's disease, iron deficiency anemia, history of endometriosis, status post abdominal hysterectomy and left oophorectomy followed by Julian BRAUN who comes to the ER with complaint of nausea, bloating and abdominal pain and constipation for the last 6 days.   Admitted for SBO.    SBO: asymptomatic. Small BM yesterday,. Passing gas. No NGT in place.  On clear liquid diet at this time, general surgery and GI following, will  See how she does with clear liquid then advance the diet slowly, if she tolerate  - Ok to discharge home per GI &surgery  - discharge instructions per GI -   follow a Low fiber diet   CT enterography in 2-4 weeks and then follow up with Dr Montelongo " after that  Continue 6MP and Remicade as scheduled  Continue Miralax daily. Pt will consider increasing to twice daily for a period of time   - discharge instructions per surgery-  Low residue diet, small frequent meals  Bowel regimen     Transient amnesia: patient had brief period of confusion while NG was being inserted in the ED (was withdrawn at that point). She does not remember it. CT was negative. Reports recent negative MRI of the brain. Neg neuro exam.  - stable at this time. Resolved.      Crohn's disease:  The patient is on infliximab and 6-mercaptopurine daily.  Infliximab she is taking every 8 weeks.  She had a higher dose last week on Wednesday.   - Cont PTA meds.  - GI to see.     Hypertension, on metoprolol 100 mg and lisinopril 20 and hydrochlorothiazide 12.5 mg daily PTA.  I   - Continue with metoprolol succinate 100 mg daily and lisinopril 20 mg daily.   - Hold the hydrochlorothiazide for now.      Shital Monahan MD    Significant Results and Procedures   none    Pending Results   None pending  Unresulted Labs Ordered in the Past 30 Days of this Admission     No orders found from 11/16/2019 to 12/17/2019.        Code Status   Full Code       Primary Care Physician   [facility]    Physical Exam   Temp: 98.1  F (36.7  C) Temp src: Oral BP: (!) 151/81 Pulse: 58 Heart Rate: 60 Resp: 16 SpO2: 100 % O2 Device: None (Room air)    Vitals:    12/16/19 2100 12/17/19 0635 12/19/19 0604   Weight: 90.9 kg (200 lb 6.4 oz) 91.6 kg (202 lb) 90.4 kg (199 lb 3.2 oz)     Vital Signs with Ranges  Temp:  [97.7  F (36.5  C)-98.1  F (36.7  C)] 98.1  F (36.7  C)  Pulse:  [58] 58  Heart Rate:  [60-65] 60  Resp:  [16-18] 16  BP: (150-174)/(77-89) 151/81  SpO2:  [97 %-100 %] 100 %  I/O last 3 completed shifts:  In: 900 [P.O.:900]  Out: -     Examination:  Well-built well-nourished. In NAD  Heart: Regular rhythm. S1S2, no murmurs, rubs,gallops  Lungs: Clear to auscultation. And no rhonchi rales  or wheezing heard.  Abdomen: Soft and nontender. Bowel sounds present.  Extremities: No swelling.  Neuro:A,A,Ox3, motor sensory grossly intact    Discharge Disposition   Discharged to home  Condition at discharge: Stable    Consultations This Hospital Stay   SURGERY GENERAL IP CONSULT  GASTROENTEROLOGY IP CONSULT    Time Spent on this Encounter   Shital LEROY MD, MD, personally saw the patient today and spent less than or equal to 30 minutes discharging this patient.    Discharge Orders   No discharge procedures on file.  Discharge Medications   Current Discharge Medication List      CONTINUE these medications which have NOT CHANGED    Details   Ferumoxytol (FERAHEME IV) Inject into the vein as needed Every 2 months prn for low Hgb      hypromellose-dextran (ARTIFICAL TEARS) 0.1-0.3 % ophthalmic solution Place 1 drop into both eyes 2 times daily as needed for dry eyes      inFLIXimab (REMICADE IV) Inject into the vein every 2 months      MERCAPTOPURINE PO Take 50 mg by mouth At Bedtime       polyethylene glycol (MIRALAX/GLYCOLAX) powder Take 1 capful by mouth daily as needed for constipation       PRINZIDE 20-12.5 MG OR TABS 2 tablets daily      TOPROL XL# 100 MG OR TB24 1 TABLET DAILY      butalbital-acetaminophen-caffeine (FIORICET/ESGIC) -40 MG per tablet Take 1 tablet by mouth every 4 hours as needed  Qty: 28 tablet, Refills: 0           Allergies   Allergies   Allergen Reactions     Ibuprofen Other (See Comments)     Told not to take due to bleeing     Innovar [Fentanyl-Droperidol] Difficulty breathing     Heavy chest     Sulfa Drugs Itching     Data   Most Recent 3 CBC's:  Recent Labs   Lab Test 12/18/19  0849 12/17/19  0844 12/16/19  1538   WBC 7.5 10.8 8.8   HGB 12.6 13.3 14.3   MCV 94 92 92    367 374      Most Recent 3 BMP's:  Recent Labs   Lab Test 12/18/19  0849 12/17/19  0844 12/16/19  1538    143 139   POTASSIUM 4.0 4.1 3.7   CHLORIDE 110* 112* 105   CO2 27 25 26    BUN 9 10 15   CR 0.71 0.65 0.69   ANIONGAP 2* 6 8   BARBER 9.6 10.0 10.6*   GLC 81 111* 95     Most Recent 2 LFT's:  Recent Labs   Lab Test 12/16/19  1538   AST 22   ALT 38   ALKPHOS 82   BILITOTAL 0.4     Most Recent INR's and Anticoagulation Dosing History:  Anticoagulation Dose History     There is no flowsheet data to display.        Most Recent 3 Troponin's:No lab results found.  Most Recent Cholesterol Panel:No lab results found.  Most Recent 6 Bacteria Isolates From Any Culture (See EPIC Reports for Culture Details):No lab results found.  Most Recent TSH, T4 and A1c Labs:No lab results found.  Results for orders placed or performed during the hospital encounter of 12/16/19   CT Abdomen Pelvis w Contrast    Narrative    CT ABDOMEN PELVIS WITH CONTRAST   12/16/2019 4:53 PM     HISTORY: Abdominal distension. Epigastric abdominal pain.    TECHNIQUE:  CT abdomen and pelvis with 98 mL Isovue-370 IV. Radiation  dose for this scan was reduced using automated exposure control,  adjustment of the mA and/or kV according to patient size, or iterative  reconstruction technique.    COMPARISON: CT abdomen pelvis on 5/3/2008    FINDINGS:   Lung bases: Are clear.    Abdomen/pelvis: Diffuse hypoattenuation of the liver, likely due to  underlying hepatic steatosis. The gallbladder is unremarkable. No  splenomegaly. No adrenal nodules. No main pancreatic ductal dilatation  or solid pancreatic mass. No radiodense kidney stones or  hydronephrosis. Left parapelvic cysts. No radiodense kidney stones or  hydronephrosis.    Multiple dilated small bowel loops with transition point in the right  upper quadrant (series 4 image [id number]) into nondilated distal loops.  Multiple radiodense objects within the lumen of the bowel of  indeterminate etiology. No significant free fluid in the abdomen or  pelvis. No free peritoneal or portal venous gas. The uterus is not  visualized, likely surgically absent. Colonic, predominantly  sigmoid  diverticulosis without CT evidence of acute diverticulitis.    Bones and soft tissues: No suspicious osseous lesion. Small  fat-containing umbilical hernia.      Impression    IMPRESSION:  1. Multiple dilated small bowel loops with a transition point in the  right lower quadrant, finding consistent with a small-bowel  obstruction.  2. Hepatic steatosis.  3. Colonic diverticulosis without CT evidence of acute diverticulitis.  4. Multiple irregular-shaped radiodense objects within the lumen of  the small bowel, indeterminant, could represent ingested  food/fragments versus less likely medication pills.    WILLIAM WILKINS MD   Head CT w/o contrast    Narrative    CT SCAN OF THE HEAD WITHOUT CONTRAST   12/16/2019 8:20 PM     HISTORY: Altered level of consciousness (LOC), altered mental status,  unexplained.    TECHNIQUE:  Axial images of the head and coronal reformations without  IV contrast material. Radiation dose for this scan was reduced using  automated exposure control, adjustment of the mA and/or kV according  to patient size, or iterative reconstruction technique.    COMPARISON: Head CT 8/28/2009.    FINDINGS:  Mild parenchymal volume loss is present. Cerebellar tonsils  are low-lying at the level of the foramen magnum, right lower than  left, incompletely characterized, probably unchanged. The cerebral  hemispheres, brainstem, and cerebellum otherwise demonstrate normal  morphology and signal. No evidence of acute ischemia, hemorrhage,  mass, mass effect, or hydrocephalus. The visualized calvarium,  tympanic cavities, mastoid cavities, and paranasal sinuses are  unremarkable.      Impression    IMPRESSION: No acute intracranial abnormality.    JUAN ALBERTO KELLY MD

## 2019-12-19 NOTE — PROGRESS NOTES
"General Surgery Progress Note    Assessment and Plan:  69 year old female with SBO, resolving  - Low residue diet, small frequent meals  - Bowel regimen  - Discharge when medically able  - Please call with any changes or questions    Interval Hx:   Denies abdominal pain, n/v, tolerating diet, +Flatus/BM, ambulating, BP up.    Exam:  Vitals: Blood pressure (!) 151/81, pulse 58, temperature 98.1  F (36.7  C), temperature source Oral, resp. rate 16, height 1.651 m (5' 5\"), weight 90.4 kg (199 lb 3.2 oz), SpO2 100 %, not currently breastfeeding.  Temperature Temp  Av.9  F (36.6  C)  Min: 97.7  F (36.5  C)  Max: 98.1  F (36.7  C)   I/O last 3 completed shifts:  In: 900 [P.O.:900]  Out: -     Gen: Alert, awake and oriented  Abd: soft, nt, nd.    Data:  No new labs/imaging.     Emily Yarbrough PA-C  [facility]  [phone]  "

## 2019-12-19 NOTE — PLAN OF CARE
A&oX. Independent. Loose stools, passing gas, active bowel sounds, ambulated in the halls frequently. Gave fioricet for headache at bedtime. Possible discharge tomorrow.

## 2019-12-19 NOTE — PROGRESS NOTES
MD Notification    Notified Person: MD    Notified Person Name: Dr. Rogel    Notification Date/Time: 12/19/19 0045    Notification Interaction: Telephone    Purpose of Notification: Pt. IV infiltrated. Drinking and voiding adequately, possibly discharging tomorrow. Can we discontinue IV?     Orders Received: Discontinue IV fluids    Comments:

## 2019-12-19 NOTE — PLAN OF CARE
A&Ox4, calm and coop. Up ind in room, amb in araiza. VSS. Denies pain. Annabelle low fiber diet, good appetite, no BM. GI and Surg s/o. Discharge to home. Instructions reviewed with pt.     Discharge    Patient discharged to home via ambulatory with spouse.     Listed belongings gathered and returned to patient. Yes  Care Plan and Patient education resolved: Yes  Prescriptions if needed, hard copies sent with patient  NA  Home and hospital acquired medications returned to patient: NA  Medication Bin checked and emptied on discharge Yes  Follow up appointment made for patient: No: pt to make.

## 2019-12-19 NOTE — PLAN OF CARE
DATE & TIME: 12/19/19 6905-2738    Cognitive Concerns/ Orientation : A&O x4   BEHAVIOR & AGGRESSION TOOL COLOR: Green  CIWA SCORE: NA   ABNL VS/O2: VSS ex) /80  MOBILITY: Independent  PAIN MANAGMENT: Denied pain this shift  DIET: Full, advancing to low fiber in morning  BOWEL/BLADDER: Continent up to bathroom. Loose BM x2 on evenings. Passing flatus  ABNL LAB/BG: None  DRAIN/DEVICES: Lost PIV access due to infiltration. IVF discontinued  TELEMETRY RHYTHM: NA  SKIN: Intact  TESTS/PROCEDURES: None  D/C DAY/GOALS/PLACE: Possibly today pending diet tolerance  OTHER IMPORTANT INFO: None

## 2019-12-19 NOTE — PROGRESS NOTES
"GASTROENTEROLOGY PROGRESS NOTE     SUBJECTIVE: Feeling well. Denies pain, nausea or vomiting. Tolerating diet. Several loose watery bowel movements overnight     OBJECTIVE:   BP (!) 151/81 (BP Location: Right arm)   Pulse 58   Temp 98.1  F (36.7  C) (Oral)   Resp 16   Ht 1.651 m (5' 5\")   Wt 90.4 kg (199 lb 3.2 oz)   SpO2 100%   BMI 33.15 kg/m     Temp (24hrs), Av.9  F (36.6  C), Min:97.7  F (36.5  C), Max:98.1  F (36.7  C)     Patient Vitals for the past 72 hrs:   Weight   19 0604 90.4 kg (199 lb 3.2 oz)   19 0635 91.6 kg (202 lb)   19 2100 90.9 kg (200 lb 6.4 oz)   19 1414 88.5 kg (195 lb)        Intake/Output Summary (Last 24 hours) at 2019 1000  Last data filed at 2019 0900  Gross per 24 hour   Intake 850 ml   Output --   Net 850 ml      PHYSICAL EXAM   Constitutional: Healthy, up in chair, no acute distress    Additional Comments:   ROS, FH, SH: See initial GI consult for details.     I have reviewed the patient's new clinical lab results:   Recent Labs   Lab Test 19  0849 19  0844 19  1538   WBC 7.5 10.8 8.8   HGB 12.6 13.3 14.3   MCV 94 92 92    367 374      Recent Labs   Lab Test 19  0849 19  0844 19  1538    143 139   POTASSIUM 4.0 4.1 3.7   CHLORIDE 110* 112* 105   CO2 27 25 26   BUN 9 10 15   CR 0.71 0.65 0.69   ANIONGAP 2* 6 8   BARBER 9.6 10.0 10.6*      Recent Labs   Lab Test 19  1538   ALBUMIN 4.5   BILITOTAL 0.4   ALT 38   AST 22   ALKPHOS 82   PROTEIN Negative   LIPASE 61*      19 CT abdomen pelvis  IMPRESSION:  1. Multiple dilated small bowel loops with a transition point in the  right lower quadrant, finding consistent with a small-bowel  obstruction.  2. Hepatic steatosis.  3. Colonic diverticulosis without CT evidence of acute diverticulitis.  4. Multiple irregular-shaped radiodense objects within the lumen of  the small bowel, indeterminant, could represent ingested  food/fragments versus " less likely medication pills.     Assessment: 69-year-old female with a history of ileal Crohn's disease presenting with abdominal pain nausea and vomiting.  CT on admission suggested a small bowel obstruction in the right lower quadrant.  The patient's Crohn's disease has been well controlled with infliximab and there had been documented resolution of her small bowel wall thickening earlier this year.  This makes a mechanical small bowel obstruction more likely or potentially she could have a some fibrostenotic complication from her Crohn's disease as well.  Pt did not tolerate NGT. Symptoms appear to be improving with conservative measures, Has not required steroids which makes it more likely that this was a mechanical SBO    Plan:   -Low fiber diet  -Plan for CT enterography in 2-4 weeks and then follow up with Dr Montelongo after that  -Continue 6MP and Remicade as scheduled  -Continue Miralax daily. Pt will consider increasing to twice daily for a period of time   -Ok to discharge today from GI standpoint  -[place] following. Please call with any questions    Saji Madden PA-C  [facility]  Cell:  [phone] Monday through Friday 9167-4925  Office: [phone]

## 2020-01-01 NOTE — DISCHARGE INSTRUCTIONS
Discharge Instructions  Headache    You were seen today for a headache. Headaches may be caused by many different things such as muscle tension, sinus inflammation, anxiety and stress, having too little sleep, too much alcohol, some medical conditions or injury. You may have a migraine, which is caused by changes in the blood vessels in your head.  At this time your doctor does not find that your headache is a sign of anything dangerous or life-threatening.  However, sometimes the signs of serious illness do not show up right away.  If you have new or worse symptoms, you may need to be seen again in the emergency department or by your primary doctor.      Return to the Emergency Department if:    You get a fever of 101 F or higher.    Your headache gets much worse.    You get a stiff neck with your headache.    You get a new headache that is different or worse than headaches you have had before.    You are vomiting and can t keep food or water down.    You have blurry or double vision or other problems with your eyes.    You have a new weakness on one side of your body.    You have difficulty with balance which is new.    You or your family thinks you are confused.    You have a seizure or convulsion.    What can I do to help myself?    Pain medications - You may take a pain medication such as Tylenol  (acetaminophen), Advil , Nuprin  (ibuprofen) or Aleve  (naproxen).  If you have been given a narcotic such as Vicodin  (hydrocodone with acetaminophen), Percocet  (oxycodone with acetaminophen), codeine, or a muscle relaxant such as Flexeril  (cyclobenzaprine) or Soma  (carisoprodol), do not drive for four hours after you have taken it. If the narcotic contains Tylenol  (acetaminophen), do not take Tylenol  with it. All narcotics will cause constipation, so eat a high fiber diet.        Take a pain reliever as soon as you notice symptoms.  Starting medications as soon as you start to have symptoms may lessen the  amount of pain you have.    Relaxing in a quiet, dark room may help.    Get enough sleep and eat meals regularly.    Schedule an appointment with your primary physician as instructed, or at least within 1 week.    You may need to watch for certain foods or other things which may trigger your headaches.  Keeping a journal of your headaches and possible triggers may help you and your primary doctor to identify things which you should avoid which may be causing your headaches.  If you were given a prescription for medicine here today, be sure to read all of the information (including the package insert) that comes with your prescription.  This will include important information about the medicine, its side effects, and any warnings that you need to know about.  The pharmacist who fills the prescription can provide more information and answer questions you may have about the medicine.  If you have questions or concerns that the pharmacist cannot address, please call or return to the Emergency Department.   Opioid Medication Information    Pain medications are among the most commonly prescribed medicines, so we are including this information for all our patients. If you did not receive pain medication or get a prescription for pain medicine, you can ignore it.     You may have been given a prescription for an opioid (narcotic) pain medicine and/or have received a pain medicine while here in the Emergency Department. These medicines can make you drowsy or impaired. You must not drive, operate dangerous equipment, or engage in any other dangerous activities while taking these medications. If you drive while taking these medications, you could be arrested for DUI, or driving under the influence. Do not drink any alcohol while you are taking these medications.     Opioid pain medications can cause addiction. If you have a history of chemical dependency of any type, you are at a higher risk of becoming addicted to pain  medications.  Only take these prescribed medications to treat your pain when all other options have been tried. Take it for as short a time and as few doses as possible. Store your pain pills in a secure place, as they are frequently stolen and provide a dangerous opportunity for children or visitors in your house to start abusing these powerful medications. We will not replace any lost or stolen medicine.  As soon as your pain is better, you should flush all your remaining medication.     Many prescription pain medications contain Tylenol  (acetaminophen), including Vicodin , Tylenol #3 , Norco , Lortab , and Percocet .  You should not take any extra pills of Tylenol  if you are using these prescription medications or you can get very sick.  Do not ever take more than 3000 mg of acetaminophen in any 24 hour period.    All opioids tend to cause constipation. Drink plenty of water and eat foods that have a lot of fiber, such as fruits, vegetables, prune juice, apple juice and high fiber cereal.  Take a laxative if you don t move your bowels at least every other day. Miralax , Milk of Magnesia, Colace , or Senna  can be used to keep you regular.      Remember that you can always come back to the Emergency Department if you are not able to see your regular doctor in the amount of time listed above, if you get any new symptoms, or if there is anything that worries you.          hypoglycemia

## 2020-03-22 ENCOUNTER — HEALTH MAINTENANCE LETTER (OUTPATIENT)
Age: 70
End: 2020-03-22

## 2021-01-15 ENCOUNTER — HEALTH MAINTENANCE LETTER (OUTPATIENT)
Age: 71
End: 2021-01-15

## 2021-03-08 ENCOUNTER — IMMUNIZATION (OUTPATIENT)
Dept: NURSING | Facility: CLINIC | Age: 71
End: 2021-03-08
Payer: MEDICARE

## 2021-03-08 PROCEDURE — 91300 PR COVID VAC PFIZER DIL RECON 30 MCG/0.3 ML IM: CPT

## 2021-03-08 PROCEDURE — 0001A PR COVID VAC PFIZER DIL RECON 30 MCG/0.3 ML IM: CPT

## 2021-03-29 ENCOUNTER — IMMUNIZATION (OUTPATIENT)
Dept: NURSING | Facility: CLINIC | Age: 71
End: 2021-03-29
Attending: INTERNAL MEDICINE
Payer: MEDICARE

## 2021-03-29 PROCEDURE — 91300 PR COVID VAC PFIZER DIL RECON 30 MCG/0.3 ML IM: CPT

## 2021-03-29 PROCEDURE — 0002A PR COVID VAC PFIZER DIL RECON 30 MCG/0.3 ML IM: CPT

## 2021-05-16 ENCOUNTER — HEALTH MAINTENANCE LETTER (OUTPATIENT)
Age: 71
End: 2021-05-16

## 2021-05-25 ENCOUNTER — APPOINTMENT (OUTPATIENT)
Dept: MRI IMAGING | Facility: CLINIC | Age: 71
End: 2021-05-25
Attending: EMERGENCY MEDICINE
Payer: MEDICARE

## 2021-05-25 ENCOUNTER — HOSPITAL ENCOUNTER (OUTPATIENT)
Facility: CLINIC | Age: 71
Setting detail: OBSERVATION
Discharge: HOME | End: 2021-05-26
Attending: EMERGENCY MEDICINE | Admitting: STUDENT IN AN ORGANIZED HEALTH CARE EDUCATION/TRAINING PROGRAM
Payer: MEDICARE

## 2021-05-25 DIAGNOSIS — G45.4 TRANSIENT GLOBAL AMNESIA: ICD-10-CM

## 2021-05-25 DIAGNOSIS — G43.109 MIGRAINE WITH AURA AND WITHOUT STATUS MIGRAINOSUS, NOT INTRACTABLE: Primary | ICD-10-CM

## 2021-05-25 LAB
ALBUMIN SERPL-MCNC: 4 G/DL (ref 3.4–5)
ALP SERPL-CCNC: 60 U/L (ref 40–150)
ALT SERPL W P-5'-P-CCNC: 39 U/L (ref 0–50)
ANION GAP SERPL CALCULATED.3IONS-SCNC: 5 MMOL/L (ref 3–14)
AST SERPL W P-5'-P-CCNC: 20 U/L (ref 0–45)
BASOPHILS # BLD AUTO: 0.1 10E9/L (ref 0–0.2)
BASOPHILS NFR BLD AUTO: 1 %
BILIRUB SERPL-MCNC: 0.4 MG/DL (ref 0.2–1.3)
BUN SERPL-MCNC: 16 MG/DL (ref 7–30)
CALCIUM SERPL-MCNC: 10 MG/DL (ref 8.5–10.1)
CHLORIDE SERPL-SCNC: 108 MMOL/L (ref 94–109)
CO2 SERPL-SCNC: 26 MMOL/L (ref 20–32)
CREAT BLD-MCNC: 0.7 MG/DL (ref 0.52–1.04)
CREAT SERPL-MCNC: 0.77 MG/DL (ref 0.52–1.04)
DIFFERENTIAL METHOD BLD: ABNORMAL
EOSINOPHIL # BLD AUTO: 0.2 10E9/L (ref 0–0.7)
EOSINOPHIL NFR BLD AUTO: 1.8 %
ERYTHROCYTE [DISTWIDTH] IN BLOOD BY AUTOMATED COUNT: 17.2 % (ref 10–15)
GFR SERPL CREATININE-BSD FRML MDRD: 78 ML/MIN/{1.73_M2}
GFR SERPL CREATININE-BSD FRML MDRD: 83 ML/MIN/{1.73_M2}
GLUCOSE SERPL-MCNC: 119 MG/DL (ref 70–99)
HCT VFR BLD AUTO: 42.1 % (ref 35–47)
HGB BLD-MCNC: 13.7 G/DL (ref 11.7–15.7)
IMM GRANULOCYTES # BLD: 0.1 10E9/L (ref 0–0.4)
IMM GRANULOCYTES NFR BLD: 0.7 %
INTERPRETATION ECG - MUSE: NORMAL
LABORATORY COMMENT REPORT: NORMAL
LACTATE BLD-SCNC: 1.2 MMOL/L (ref 0.7–2)
LYMPHOCYTES # BLD AUTO: 0.4 10E9/L (ref 0.8–5.3)
LYMPHOCYTES NFR BLD AUTO: 4.8 %
MCH RBC QN AUTO: 31.3 PG (ref 26.5–33)
MCHC RBC AUTO-ENTMCNC: 32.5 G/DL (ref 31.5–36.5)
MCV RBC AUTO: 96 FL (ref 78–100)
MONOCYTES # BLD AUTO: 0.6 10E9/L (ref 0–1.3)
MONOCYTES NFR BLD AUTO: 7.2 %
NEUTROPHILS # BLD AUTO: 7.5 10E9/L (ref 1.6–8.3)
NEUTROPHILS NFR BLD AUTO: 84.5 %
NRBC # BLD AUTO: 0 10*3/UL
NRBC BLD AUTO-RTO: 0 /100
PLATELET # BLD AUTO: 332 10E9/L (ref 150–450)
POTASSIUM SERPL-SCNC: 3.9 MMOL/L (ref 3.4–5.3)
PROT SERPL-MCNC: 7.5 G/DL (ref 6.8–8.8)
RBC # BLD AUTO: 4.38 10E12/L (ref 3.8–5.2)
SARS-COV-2 RNA RESP QL NAA+PROBE: NEGATIVE
SODIUM SERPL-SCNC: 139 MMOL/L (ref 133–144)
SPECIMEN SOURCE: NORMAL
WBC # BLD AUTO: 8.8 10E9/L (ref 4–11)

## 2021-05-25 PROCEDURE — A9585 GADOBUTROL INJECTION: HCPCS | Performed by: EMERGENCY MEDICINE

## 2021-05-25 PROCEDURE — 87635 SARS-COV-2 COVID-19 AMP PRB: CPT | Performed by: EMERGENCY MEDICINE

## 2021-05-25 PROCEDURE — 250N000011 HC RX IP 250 OP 636: Performed by: EMERGENCY MEDICINE

## 2021-05-25 PROCEDURE — 70553 MRI BRAIN STEM W/O & W/DYE: CPT

## 2021-05-25 PROCEDURE — 255N000002 HC RX 255 OP 636: Performed by: EMERGENCY MEDICINE

## 2021-05-25 PROCEDURE — 80053 COMPREHEN METABOLIC PANEL: CPT | Performed by: EMERGENCY MEDICINE

## 2021-05-25 PROCEDURE — 83605 ASSAY OF LACTIC ACID: CPT | Performed by: STUDENT IN AN ORGANIZED HEALTH CARE EDUCATION/TRAINING PROGRAM

## 2021-05-25 PROCEDURE — 93005 ELECTROCARDIOGRAM TRACING: CPT

## 2021-05-25 PROCEDURE — 250N000013 HC RX MED GY IP 250 OP 250 PS 637: Performed by: STUDENT IN AN ORGANIZED HEALTH CARE EDUCATION/TRAINING PROGRAM

## 2021-05-25 PROCEDURE — G0378 HOSPITAL OBSERVATION PER HR: HCPCS

## 2021-05-25 PROCEDURE — 85025 COMPLETE CBC W/AUTO DIFF WBC: CPT | Performed by: EMERGENCY MEDICINE

## 2021-05-25 PROCEDURE — 36415 COLL VENOUS BLD VENIPUNCTURE: CPT | Performed by: STUDENT IN AN ORGANIZED HEALTH CARE EDUCATION/TRAINING PROGRAM

## 2021-05-25 PROCEDURE — 96374 THER/PROPH/DIAG INJ IV PUSH: CPT | Mod: 59

## 2021-05-25 PROCEDURE — C9803 HOPD COVID-19 SPEC COLLECT: HCPCS

## 2021-05-25 PROCEDURE — 82565 ASSAY OF CREATININE: CPT | Mod: 91

## 2021-05-25 PROCEDURE — 99285 EMERGENCY DEPT VISIT HI MDM: CPT | Mod: 25

## 2021-05-25 PROCEDURE — 99220 PR INITIAL OBSERVATION CARE,LEVEL III: CPT | Performed by: STUDENT IN AN ORGANIZED HEALTH CARE EDUCATION/TRAINING PROGRAM

## 2021-05-25 RX ORDER — PROCHLORPERAZINE MALEATE 5 MG
5 TABLET ORAL EVERY 6 HOURS PRN
Status: DISCONTINUED | OUTPATIENT
Start: 2021-05-25 | End: 2021-05-26 | Stop reason: HOSPADM

## 2021-05-25 RX ORDER — ACETAMINOPHEN 325 MG/1
650 TABLET ORAL EVERY 4 HOURS PRN
Status: DISCONTINUED | OUTPATIENT
Start: 2021-05-25 | End: 2021-05-26 | Stop reason: HOSPADM

## 2021-05-25 RX ORDER — ONDANSETRON 2 MG/ML
4 INJECTION INTRAMUSCULAR; INTRAVENOUS EVERY 6 HOURS PRN
Status: DISCONTINUED | OUTPATIENT
Start: 2021-05-25 | End: 2021-05-26 | Stop reason: HOSPADM

## 2021-05-25 RX ORDER — PROCHLORPERAZINE 25 MG
12.5 SUPPOSITORY, RECTAL RECTAL EVERY 12 HOURS PRN
Status: DISCONTINUED | OUTPATIENT
Start: 2021-05-25 | End: 2021-05-26 | Stop reason: HOSPADM

## 2021-05-25 RX ORDER — BUTALBITAL, ACETAMINOPHEN AND CAFFEINE 50; 325; 40 MG/1; MG/1; MG/1
1 TABLET ORAL EVERY 4 HOURS PRN
Status: DISCONTINUED | OUTPATIENT
Start: 2021-05-25 | End: 2021-05-26 | Stop reason: HOSPADM

## 2021-05-25 RX ORDER — ACETAMINOPHEN 650 MG/1
650 SUPPOSITORY RECTAL EVERY 4 HOURS PRN
Status: DISCONTINUED | OUTPATIENT
Start: 2021-05-25 | End: 2021-05-26 | Stop reason: HOSPADM

## 2021-05-25 RX ORDER — METOPROLOL SUCCINATE 100 MG/1
100 TABLET, EXTENDED RELEASE ORAL DAILY
Status: DISCONTINUED | OUTPATIENT
Start: 2021-05-26 | End: 2021-05-26 | Stop reason: HOSPADM

## 2021-05-25 RX ORDER — CHOLECALCIFEROL (VITAMIN D3) 50 MCG
50 TABLET ORAL DAILY
COMMUNITY

## 2021-05-25 RX ORDER — MERCAPTOPURINE 50 MG/1
50 TABLET ORAL AT BEDTIME
Status: DISCONTINUED | OUTPATIENT
Start: 2021-05-25 | End: 2021-05-26 | Stop reason: HOSPADM

## 2021-05-25 RX ORDER — ONDANSETRON 4 MG/1
4 TABLET, ORALLY DISINTEGRATING ORAL EVERY 6 HOURS PRN
Status: DISCONTINUED | OUTPATIENT
Start: 2021-05-25 | End: 2021-05-26 | Stop reason: HOSPADM

## 2021-05-25 RX ORDER — LISINOPRIL AND HYDROCHLOROTHIAZIDE 12.5; 2 MG/1; MG/1
1 TABLET ORAL 2 TIMES DAILY
Status: DISCONTINUED | OUTPATIENT
Start: 2021-05-25 | End: 2021-05-26 | Stop reason: HOSPADM

## 2021-05-25 RX ORDER — LORAZEPAM 2 MG/ML
0.5 INJECTION INTRAMUSCULAR ONCE
Status: COMPLETED | OUTPATIENT
Start: 2021-05-25 | End: 2021-05-25

## 2021-05-25 RX ORDER — GADOBUTROL 604.72 MG/ML
10 INJECTION INTRAVENOUS ONCE
Status: COMPLETED | OUTPATIENT
Start: 2021-05-25 | End: 2021-05-25

## 2021-05-25 RX ORDER — LISINOPRIL AND HYDROCHLOROTHIAZIDE 12.5; 2 MG/1; MG/1
1 TABLET ORAL 2 TIMES DAILY
COMMUNITY
Start: 2021-04-22 | End: 2022-10-10

## 2021-05-25 RX ORDER — MERCAPTOPURINE 50 MG/1
50 TABLET ORAL AT BEDTIME
COMMUNITY
Start: 2021-03-19

## 2021-05-25 RX ORDER — LORAZEPAM 2 MG/ML
1 INJECTION INTRAMUSCULAR ONCE
Status: COMPLETED | OUTPATIENT
Start: 2021-05-25 | End: 2021-05-25

## 2021-05-25 RX ORDER — METOPROLOL SUCCINATE 100 MG/1
100 TABLET, EXTENDED RELEASE ORAL DAILY
COMMUNITY
Start: 2021-05-19

## 2021-05-25 RX ADMIN — LORAZEPAM 1 MG: 2 INJECTION INTRAMUSCULAR; INTRAVENOUS at 13:36

## 2021-05-25 RX ADMIN — LORAZEPAM 0.5 MG: 2 INJECTION INTRAMUSCULAR; INTRAVENOUS at 13:15

## 2021-05-25 RX ADMIN — MERCAPTOPURINE 50 MG: 50 TABLET ORAL at 21:39

## 2021-05-25 RX ADMIN — BUTALBITAL, ACETAMINOPHEN AND CAFFEINE 1 TABLET: 50; 325; 40 TABLET ORAL at 22:09

## 2021-05-25 RX ADMIN — GADOBUTROL 10 ML: 604.72 INJECTION INTRAVENOUS at 13:20

## 2021-05-25 RX ADMIN — LISINOPRIL AND HYDROCHLOROTHIAZIDE 1 TABLET: 12.5; 2 TABLET ORAL at 21:40

## 2021-05-25 ASSESSMENT — ENCOUNTER SYMPTOMS: CONFUSION: 1

## 2021-05-25 ASSESSMENT — MIFFLIN-ST. JEOR: SCORE: 1405.39

## 2021-05-25 NOTE — ED NOTES
Patient required 1.5 mg ativan to get her into the MRI scanner. Patient IV on the right side infiltrated. New IV was placed in patients left hand. Vitals remain stable post medication administration. Will continue to monitor while patient continues to get scan.

## 2021-05-25 NOTE — PROGRESS NOTES
RECEIVING UNIT ED HANDOFF REVIEW    ED Nurse Handoff Report was reviewed by: Joy Florence RN on May 25, 2021 at 5:41 PM

## 2021-05-25 NOTE — PHARMACY-ADMISSION MEDICATION HISTORY
Pharmacy Medication History  Admission medication history interview status for the 5/25/2021  admission is complete. See EPIC admission navigator for prior to admission medications     Location of Interview: Patient room  Medication history sources: Patient, Patient's family/friend (spouse) and Surescripts    Significant changes made to the medication list:  Added vit d  Removed art tears, protonix, senna s    In the past week, patient estimated taking medication this percent of the time: greater than 90%    Additional medication history information:   Is not sure if she took her meds today or yesterday, but she is here for TIA    Medication reconciliation completed by provider prior to medication history? No    Time spent in this activity: 15 minutes    Prior to Admission medications    Medication Sig Last Dose Taking? Auth Provider   butalbital-acetaminophen-caffeine (FIORICET/ESGIC) -40 MG per tablet Take 1 tablet by mouth every 4 hours as needed prn Yes Daniel Martínez MD   Ferumoxytol (FERAHEME IV) Inject into the vein as needed Every 2 months prn for low Hgb  Yes Unknown, Entered By History   inFLIXimab (REMICADE IV) Inject into the vein every 2 months  Yes Unknown, Entered By History   lisinopril-hydrochlorothiazide (ZESTORETIC) 20-12.5 MG tablet Take 1 tablet by mouth 2 times daily 5/24/2021 at Unknown time Yes Unknown, Entered By History   mercaptopurine (PURINETHOL) 50 MG tablet Take 50 mg by mouth At Bedtime  5/24/2021 at Unknown time Yes Unknown, Entered By History   polyethylene glycol (MIRALAX/GLYCOLAX) powder Take 1 capful by mouth daily as needed for constipation  prn Yes Reported, Patient   vitamin D3 (CHOLECALCIFEROL) 50 mcg (2000 units) tablet Take 50 mcg by mouth daily 5/24/2021 at Unknown time Yes Unknown, Entered By History   metoprolol succinate ER (TOPROL-XL) 100 MG 24 hr tablet Take 100 mg by mouth daily 5/24/2021 Yes Unknown, Entered By History       The information provided in this  note is only as accurate as the sources available at the time of update(s)

## 2021-05-25 NOTE — ED NOTES
Bed: ST02  Expected date:   Expected time:   Means of arrival:   Comments:  [facility] [place] - 433 - 70 F AMS eta 1217

## 2021-05-25 NOTE — ED NOTES
[facility]  ED Nurse Handoff Report    ED Chief complaint: Altered Mental Status      ED Diagnosis:   Final diagnoses:   Transient global amnesia       Code Status: Full Code    Allergies:   Allergies   Allergen Reactions     Ibuprofen Other (See Comments)     Told not to take due to bleeing     Innovar [Fentanyl-Droperidol] Difficulty breathing     Heavy chest     Sulfa Drugs Itching       Patient Story: told  this morning she felt unwell and confused.   Focused Assessment:  Confusion, No focal deficits.  Labs Ordered and Resulted from Time of ED Arrival Up to the Time of Departure from the ED   CBC WITH PLATELETS DIFFERENTIAL - Abnormal; Notable for the following components:       Result Value    RDW 17.2 (*)     Absolute Lymphocytes 0.4 (*)     All other components within normal limits   COMPREHENSIVE METABOLIC PANEL - Abnormal; Notable for the following components:    Glucose 119 (*)     All other components within normal limits   CREATININE POCT   SARS-COV-2 (COVID-19) VIRUS RT-PCR   ISTAT CREATININE NURSING POCT     MR Brain w/o & w Contrast   Final Result   IMPRESSION: Normal brain MRI.          BALWINDER LOZANO MD            Treatments and/or interventions provided: ativan for MRI,   Patient's response to treatments and/or interventions: resting on cot    To be done/followed up on inpatient unit:  per admitting    Does this patient have any cognitive concerns?: Disoriented to time, Disoriented to place and Disoriented to situation    Activity level - Baseline/Home:  Independent  Activity Level - Current:   Stand with Assist    Patient's Preferred language: English   Needed?: No    Isolation: None  Infection: Not Applicable  Patient tested for COVID 19 prior to admission: YES  Bariatric?: No    Vital Signs:   Vitals:    05/25/21 1238 05/25/21 1250 05/25/21 1356 05/25/21 1432   BP:  (!) 173/79  (!) 150/84   Pulse:  90 86 97   Resp:  11     Temp:       TempSrc:       SpO2:  97%  93% 98%   Weight: 95.4 kg (210 lb 5.1 oz)          Cardiac Rhythm:     Was the PSS-3 completed:   Yes  What interventions are required if any?               Family Comments:   OBS brochure/video discussed/provided to patient/family: Yes    For the majority of the shift this patient's behavior was Green.   Behavioral interventions performed were care and rounding.    ED NURSE PHONE NUMBER: *[phone]

## 2021-05-25 NOTE — ED PROVIDER NOTES
History   Chief Complaint:  Altered mental status      HPI   Limited 2/2 AMS  Jacklyn Arriola is a 70 year old female with history of anemia and hypertension who presents with altered mental status. EMS reports that at around 1000 this morning the patient's  went outside, about an hour later the patient came out and told her  that she was confused and felt unwell. EMS was called and reports that when they arrived the patient was able to walk and appeared to have good balance. She was given 4 mg of Zofran by EMS. Her blood sugar was 119 and her blood pressure was 180/80. Additionally, the patient's  told EMS that the patient has had a similar episode of confusion in the past, but it is unclear what the cause of this episode was. The patient herself states that she is unclear what happened this morning, but denies any other complaint besides confusion. She can remember her name and birthday but can't remember anything that happened in the recent past, isn't oriented to date/location/etc. She keeps asking repetitive questions.    Review of Systems   Psychiatric/Behavioral: Positive for confusion.   All other systems reviewed and are negative.      Allergies:  Ibuprofen  Innovar [Fentanyl-Droperidol]  Sulfa Drugs    Medications:  Feraheme  Fioricet  Rmicade  Mercaptopurine  Protonix  Miralax  Senokot  Toprol    Past Medical History:    Anemia  Crohn's disease   Hypertension  Small bowel obstruction  Iron deficiency     Past Surgical History:    Hysterectomy     Social History:  The patient is .  She presents alone via EMS.     Physical Exam     Patient Vitals for the past 24 hrs:   BP Temp Temp src Pulse Resp SpO2 Weight   05/25/21 1432 (!) 150/84 -- -- 97 -- 98 % --   05/25/21 1356 -- -- -- 86 -- 93 % --   05/25/21 1250 (!) 173/79 -- -- 90 11 97 % --   05/25/21 1238 -- -- -- -- -- -- 95.4 kg (210 lb 5.1 oz)   05/25/21 1235 (!) 154/95 -- -- 88 19 98 % --   05/25/21 1222 (!) 172/91 --  -- -- -- -- --   05/25/21 1221 -- 97.8  F (36.6  C) Oral 89 -- 96 % --       Physical Exam  General/Appearance: appears stated age, well-groomed, appears comfortable  Eyes: EOMI, no scleral injection, no icterus  ENT: MMM  Neck: supple, nl ROM, no stiffness  Cardiovascular: RRR, nl S1S2, no m/r/g, 2+ pulses in all 4 extremities, cap refill <2sec  Respiratory: CTAB, good air movement throughout, no wheezes/rhonchi/rales, no increased WOB, no retractions  Back: no lesions  GI: abd soft, non-distended, nttp,  no HSM, no rebound, no guarding, nl BS  MSK: GODOY, good tone, no bony abnormality  Skin: warm and well-perfused, no rash, no edema, no ecchymosis, nl turgor  Neuro: GCS 15, alert, oriented to self and birthday, not oriented to place/time, not able to recite any events from past several days -to - weeks  Psych: calm and cooperative  Heme: no petechia, no purpura, no active bleeding        Emergency Department Course   ECG  ECG taken at 1226, ECG read at 1319  Normal sinus rhythm  Nonspecific ST abnormality   Rate 89 bpm. MS interval 172 ms. QRS duration 92 ms. QT/QTc 368/447 ms. P-R-T axes 49 13 14.     Imaging:  MR Brain WO & W Contrast  Normal brain MRI.  Reading per radiology.    Laboratory:  CBC: WBC 8.8, HGB 13.7,    CMP: Glucose 119 (H) o/w WNL (Creatinine 0.77)      Creatinine POCT: 0.7, GFR 83    Asymptomatic COVID19 Virus PCR by nasopharyngeal swab: Negative    Interventions:  1315 Ativan 0.5 mg IV   1336 Ativan 0.5 mg IV     Emergency Department Course:  1216 The patient arrived with EMS and I was on hand to recieve report from EMS.    1219 I obtained a history and performed a physical examination as documented above.     1220 IV placed and blood drawn. This was sent to the lab for further testing, results above. I asked for stroke neurology to be paged.     1223 I spoke with RUSSELL Maldonado, stroke neurology, regarding the patient.     1226 EKG obtained in the ED, see results above. I viewed the  results at this time.     1308 The patient and her , who is now at the bedside, were updated. The  did not have any additional information to add regarding the patient's visit today.     1446 I rechecked and updated the patient.     1515 I spoke with Dr. Fernandez, hospitalist, who agreed to admit the patient.     Disposition:  The patient was admitted to the hospital under the care of Dr. Fernandez.       Impression & Plan     Medical Decision Making:  This patient is a pleasant 70-year-old female who presents with acute confusion.  Around 10:00 this morning she was normal and around 11:00 she went out to her  and told him that she is confused.  It appears she is not making any new memory since then.  She is asking repetitive questions, is oriented to herself but otherwise not oriented to time, place, situation.  I spoke with the stroke neurology team who agreed that she was not in acute stroke and that this sounded more like transient global amnesia.  We did an MRI just to help rule out obvious stroke.  Thankfully this came back as normal.  At this point in time on repeat physical exam the patient still is not making any new memories.  Per stroke neurology's request and recommendation we will bring her in, do every 4 neuro checks, and have her spend the night in the hospital to ensure that no new neurologic deficits develop.  Covid-19  Jacklyn Arriola was evaluated during a global COVID-19 pandemic, which necessitated consideration that the patient might be at risk for infection with the SARS-CoV-2 virus that causes COVID-19.   Applicable protocols for evaluation were followed during the patient's care.   COVID-19 was considered as part of the patient's evaluation. The plan for testing is:  a test was obtained during this visit.    Diagnosis:  No diagnosis found.      Scribe Disclosure:  I, Yong Leach, am serving as a scribe on 5/25/2021 at 12:34 PM to personally document services  performed by Mitzi Rogel MD based on my observations and the provider's statements to me.            Mitzi Rogel MD  05/25/21 3954

## 2021-05-25 NOTE — H&P
[facility]    History and Physical - Hospitalist Service       Date of Admission:  5/25/2021    Assessment & Plan   Jacklyn Arriola is a 70 year old female admitted on 5/25/2021. She presents with transient global amnesia. This is her second episode of this.    Transient global amnesia  Had a prior episode of this approximately 2.5 years prior. Last known well was at 10 AM. At approximately 11 AM she exited her house and told her  she was having issues with her memory. Demonstrates no neuro deficits on exam, just a inability to form new memories.  Does have a prior history of transient global amnesia in 2019 that was associated with a small bowel obstruction.  *MRI brain without any acute pathology  -Admit to observation  -Every 4 neurochecks until she can form new memories to monitor for possible developing other issues  -Expect that she will resolve this within the next 12 to 24 hours. Should be able to discharge afterwards.    Crohn's disease  -Continue PTA meds once verified    Hypertension  -Continue PTA meds once verified       Diet:   Regular  DVT Prophylaxis: Ambulate every shift  Figueroa Catheter: not present  Code Status:   DNR/DNI         Disposition Plan   Expected discharge: Tomorrow, recommended to prior living arrangement once memory returns.  Entered: Soren Fernandez MD 05/25/2021, 3:14 PM     The patient's care was discussed with the Patient, Patient's Family and ED Team.    Soren Fernandez MD  [facility]  Contact information available via [facility] Paging/Directory      ______________________________________________________________________    Chief Complaint   Memory issues    History is obtained from the patient, electronic health record, emergency department physician and patient's spouse    History of Present Illness   Jacklyn Arriola is a 70 year old female who has a history of Crohn's disease, hypertension, and remote history  of transient global amnesia who presents with acute loss of ability to form new memories. She was seen well by her  at approximately 10 AM this morning, and then at 11 AM reported that she was having issues with her memory to him. Prior to onset of this she was having some diarrhea attributed to her chronic Crohn's disease. She otherwise had a negative review of systems x10 per the . At time of exam, she also has a negative review of systems x10. Since onset of reported memory issues to her , she is unable to form new memories or recall things that have happened during that time. She still has good recall of prior events. She is asking the same question repeatedly, and is uncertain of what she is doing in the emergency room despite being told multiple times.  notes that she has no change in her personality.    In the emergency department she was under the care of Dr. Mitzi Rogel, and the case was discussed with her. Work-up was notable for CBC and BMP within normal limits, and a brain MRI that demonstrated no acute pathology. Dr. Rogel did discuss case with stroke neurology as well who recommended admission to observation for neuro checks until resolution of memory forming ability.    I did discuss the case at length with the  at bedside explaining the expected course and plan for management in the emergency department and on admission. His questions were answered to his satisfaction.    Review of Systems    The 10 point Review of Systems is negative other than noted in the HPI or here.     Past Medical History    I have reviewed this patient's medical history and updated it with pertinent information if needed.   Past Medical History:   Diagnosis Date     Anemia      Crohn's disease (H)      Hypertension        Past Surgical History   I have reviewed this patient's surgical history and updated it with pertinent information if needed.  Past Surgical History:   Procedure  Laterality Date     GYN SURGERY      hysterectomy       Social History   I have reviewed this patient's social history and updated it with pertinent information if needed.  Social History     Tobacco Use     Smoking status: Never Smoker     Smokeless tobacco: Never Used   Substance Use Topics     Alcohol use: Never     Frequency: Never     Drug use: Never       Family History     No family history was disclosed transient global amnesia, or other transient neurologic syndromes.    Prior to Admission Medications   Prior to Admission Medications   Prescriptions Last Dose Informant Patient Reported? Taking?   Ferumoxytol (FERAHEME IV)  Self Yes No   Sig: Inject into the vein as needed Every 2 months prn for low Hgb   MERCAPTOPURINE PO  Self Yes No   Sig: Take 50 mg by mouth At Bedtime    PRINZIDE 20-12.5 MG OR TABS  Self Yes No   Si tablets daily   TOPROL XL# 100 MG OR TB24  Self Yes No   Si TABLET DAILY   butalbital-acetaminophen-caffeine (FIORICET/ESGIC) -40 MG per tablet  Self No No   Sig: Take 1 tablet by mouth every 4 hours as needed   hypromellose-dextran (ARTIFICAL TEARS) 0.1-0.3 % ophthalmic solution  Self Yes No   Sig: Place 1 drop into both eyes 2 times daily as needed for dry eyes   inFLIXimab (REMICADE IV)  Self Yes No   Sig: Inject into the vein every 2 months   pantoprazole (PROTONIX) 40 MG EC tablet   No No   Sig: Take 1 tablet (40 mg) by mouth every morning (before breakfast)   polyethylene glycol (MIRALAX/GLYCOLAX) powder  Self Yes No   Sig: Take 1 capful by mouth daily as needed for constipation    senna-docusate (SENOKOT-S/PERICOLACE) 8.6-50 MG tablet   No No   Sig: Take 1 tablet by mouth 2 times daily      Facility-Administered Medications: None     Allergies   Allergies   Allergen Reactions     Ibuprofen Other (See Comments)     Told not to take due to bleeing     Innovar [Fentanyl-Droperidol] Difficulty breathing     Heavy chest     Sulfa Drugs Itching       Physical Exam   Vital  Signs: Temp: 97.8  F (36.6  C) Temp src: Oral BP: (!) 150/84 Pulse: 97   Resp: 11 SpO2: 98 %      Weight: 210 lbs 5.1 oz    Constitutional: Awake, alert, cooperative, no apparent cardiopulmonary distress.  Eyes: Conjunctiva and pupils examined and normal.  HEENT: Moist mucous membranes, normal dentition.  Respiratory: Clear to auscultation bilaterally, no crackles or wheezing.  Cardiovascular: Regular rate and rhythm, normal S1 and S2, and no murmur noted.  GI: Soft, non-distended, non-tender, normal bowel sounds.  Lymph/Hematologic: No anterior cervical or supraclavicular adenopathy.  Skin: No rashes, no cyanosis, no edema noted on exposed skin.  Musculoskeletal: No joint swelling, erythema or tenderness. No gross bony abnormalities  Neurologic: Cranial nerves 2-12 grossly intact, normal strength and sensation.  Psychiatric: Alert, oriented to person, place and time, no obvious anxiety or depression. Patient is demonstrating extremely short memory, with inability to remember who I am despite being introduced multiple times or why she is in the hospital.      Data   Data reviewed today: I reviewed all medications, new labs and imaging results over the last 24 hours. I personally reviewed the brain MRI image(s) showing no acute pathology.    Recent Labs   Lab 05/25/21  1234   WBC 8.8   HGB 13.7   MCV 96         POTASSIUM 3.9   CHLORIDE 108   CO2 26   BUN 16   CR 0.77   ANIONGAP 5   BARBER 10.0   *   ALBUMIN 4.0   PROTTOTAL 7.5   BILITOTAL 0.4   ALKPHOS 60   ALT 39   AST 20     Recent Results (from the past 24 hour(s))   MR Brain w/o & w Contrast    Narrative    MRI OF THE BRAIN WITHOUT AND WITH CONTRAST 5/25/2021 2:27 PM     COMPARISON: Head CT 12/16/2019    HISTORY:  Altered mental status, confusion.     TECHNIQUE: Multi-sequence, multi-planar MRI images of the brain were  acquired before and after the administration of IV gadolinium (10 mL  Gadavist).    FINDINGS: The ventricles and basal cisterns  are normal in  configuration. There is no midline shift. There are no extra-axial  fluid collections. Gray-white differentiation is well maintained.  There is no evidence for stroke or acute intracranial hemorrhage.  There is no abnormal contrast enhancement in the brain or its  coverings.    There is no sinusitis or mastoiditis.      Impression    IMPRESSION: Normal brain MRI.       BALWINDER LOZANO MD

## 2021-05-25 NOTE — ED TRIAGE NOTES
Patient arrives to the ED with confusion. Patient reportedly was at baseline mentation at 1000 then around 1100 patient went outside and told her  that she was feeling confused. Patient is disoriented to place, time, and situation. Patient has no memory of what occurred this morning but has no neurologic deficits aside from the confusion.

## 2021-05-26 VITALS
HEART RATE: 85 BPM | BODY MASS INDEX: 32.49 KG/M2 | SYSTOLIC BLOOD PRESSURE: 120 MMHG | TEMPERATURE: 96.4 F | WEIGHT: 195 LBS | OXYGEN SATURATION: 95 % | DIASTOLIC BLOOD PRESSURE: 65 MMHG | HEIGHT: 65 IN | RESPIRATION RATE: 18 BRPM

## 2021-05-26 PROCEDURE — 250N000013 HC RX MED GY IP 250 OP 250 PS 637: Performed by: STUDENT IN AN ORGANIZED HEALTH CARE EDUCATION/TRAINING PROGRAM

## 2021-05-26 PROCEDURE — 99217 PR OBSERVATION CARE DISCHARGE: CPT | Performed by: PHYSICIAN ASSISTANT

## 2021-05-26 PROCEDURE — 99207 PR CDG-CODE CATEGORY CHANGED: CPT | Performed by: PHYSICIAN ASSISTANT

## 2021-05-26 PROCEDURE — G0378 HOSPITAL OBSERVATION PER HR: HCPCS

## 2021-05-26 RX ORDER — BUTALBITAL, ACETAMINOPHEN AND CAFFEINE 50; 325; 40 MG/1; MG/1; MG/1
1 TABLET ORAL EVERY 4 HOURS PRN
Qty: 3 TABLET | Refills: 0 | Status: SHIPPED | OUTPATIENT
Start: 2021-05-26

## 2021-05-26 RX ADMIN — BUTALBITAL, ACETAMINOPHEN AND CAFFEINE 1 TABLET: 50; 325; 40 TABLET ORAL at 06:52

## 2021-05-26 RX ADMIN — METOPROLOL SUCCINATE 100 MG: 100 TABLET, EXTENDED RELEASE ORAL at 08:32

## 2021-05-26 RX ADMIN — LISINOPRIL AND HYDROCHLOROTHIAZIDE 1 TABLET: 12.5; 2 TABLET ORAL at 08:32

## 2021-05-26 RX ADMIN — BUTALBITAL, ACETAMINOPHEN AND CAFFEINE 1 TABLET: 50; 325; 40 TABLET ORAL at 01:49

## 2021-05-26 NOTE — PROGRESS NOTES
MD Notification    Notified Person: MD    Notified Person Name: Soren Fernandez    Notification Date/Time: 5/25/21/ PM @ 8:50 PM    Notification Interaction: Page    Purpose of Notification: Obs 21 BP, Patient is wondering if she can get her home medication FIORICET ordered. she feels a migraine coming on. Thank you.     Orders Received: Yes    Comments:

## 2021-05-26 NOTE — PROGRESS NOTES
Observation goals PRIOR TO DISCHARGE     Comments:     -diagnostic tests and consults completed and resulted :Met    -vital signs normal or at patient baseline : Met    Nurse to notify provider when observation goals have been met and patient is ready for discharge.

## 2021-05-26 NOTE — DISCHARGE SUMMARY
[facility]    Discharge Summary  Hospitalist    Date of Admission:  5/25/2021  Date of Discharge:  5/26/2021  1:11 PM  Discharging Provider: Echo Purcell PA-C    Discharge Diagnoses   Memory loss, suspected transient global amnesia vs complex migraine  Chron's disease  HTN  Headache     History of Present Illness   Jacklyn Arriola is an 70 year old female who presented to the Emergency Department via EMS for evaluation of confusion, inability to form new memories. The patient's  reported on the morning of admission she suddenly started to be very repetitive and reported inability to remember things. She reports onset was proceeded by several episodes of diarrhea. She underwent MRI in the ED which was unremarkable but continued to be confused/repetitive prompting observation admission. Please see admission H&P by Dr. Fernandez for additional information.     Hospital Course   Jacklyn Arriola was admitted on 5/25/2021.  The following problems were addressed during her hospitalization:    Memory loss, Transient global amnesia vs complex migraine   Had a prior episode of this approximately 2.5 years prior. Last known well was at 10 AM. At approximately 11 AM she exited her house and told her  she was having issues with her memory. Demonstrates no neuro deficits on exam, just a inability to form new memories.  Does have a prior history of transient global amnesia in 2019 that was associated with a small bowel obstruction. Last episode also occurred in the setting of a headache. She has chronic migraines and does not recall having aura yesterday but has classic migraine day of discharge.   *MRI brain without any acute pathology  Symptoms resolved through admission though she still cannot recall events on day of admission. She will follow up with her Neurologist post discharge.      Crohn's disease  -Continued PTA mercaptopurine, resume remicade at discharge       Hypertension  -Continued PTA lisinopril-hydrochlorothiazide and metoprolol      Headache. Pt uses Fioricet PTA which has been continued prn     Patient was discussed with Dr. Casas who agrees with the above plan     Echo Purcell PA-C, [facility]    Significant Results and Procedures   See below     Code Status   Full Code       Primary Care Physician   [facility]    Physical Exam   Temp: 96.4  F (35.8  C) Temp src: Oral BP: 120/65 Pulse: 85   Resp: 18 SpO2: 95 % O2 Device: None (Room air)    Vitals:    05/25/21 1238 05/25/21 1824   Weight: 95.4 kg (210 lb 5.1 oz) 88.5 kg (195 lb)     Vital Signs with Ranges  Temp:  [96.1  F (35.6  C)-97.8  F (36.6  C)] 96.4  F (35.8  C)  Pulse:  [] 85  Resp:  [14-22] 18  BP: (120-155)/(54-91) 120/65  SpO2:  [94 %-97 %] 95 %  I/O last 3 completed shifts:  In: 240 [P.O.:240]  Out: -     Constitutional: Alert and oriented x4. Sitting up in bed. Appears comfortable and is appropriately conversant.   ENT: moist mucous membranes  Eyes:  Sclera anicteric, EOMI  Respiratory: Lungs clear to auscultation bilaterally, no increased work of breathing or wheezing   Cardiovascular: Regular rate and rhythm  GI: active bowel sounds, abdomen soft, non-tender  MSK:  Moves all four extremities. Normal tone  Neuro:  Speech is clear. Face symmetric. Tongue midline. CN 2-12 grossly intact.      Discharge Disposition   Discharged to home  Condition at discharge: Stable    Consultations This Hospital Stay   None    Time Spent on this Encounter   I, Echo Purcell PA-C, personally saw the patient today and spent greater than 30 minutes discharging this patient.    Discharge Orders      Reason for your hospital stay    You were here for evaluation of confusion.     Follow-up and recommended labs and tests     Follow up with primary care provider within one week for hospital follow up.    [facility]  [address] [place] [address]         Follow up  with Neurology in 4-6 weeks for hospital follow up. You can call [facility] at [phone] to schedule this or if you have seen another Neurology group that is fine as well     Activity    Your activity upon discharge: activity as tolerated     No CPR- Do NOT Intubate     Diet    Follow this diet upon discharge: Orders Placed This Encounter      Combination Diet Regular Diet Adult     Discharge Medications   Discharge Medication List as of 5/26/2021 12:30 PM      CONTINUE these medications which have CHANGED    Details   butalbital-acetaminophen-caffeine (ESGIC) -40 MG tablet Take 1 tablet by mouth every 4 hours as needed, Disp-3 tablet, R-0, Local Print         CONTINUE these medications which have NOT CHANGED    Details   Ferumoxytol (FERAHEME IV) Inject into the vein as needed Every 2 months prn for low Hgb, Historical      inFLIXimab (REMICADE IV) Inject into the vein every 2 months, Historical      lisinopril-hydrochlorothiazide (ZESTORETIC) 20-12.5 MG tablet Take 1 tablet by mouth 2 times daily, Historical      mercaptopurine (PURINETHOL) 50 MG tablet Take 50 mg by mouth At Bedtime , Historical      metoprolol succinate ER (TOPROL-XL) 100 MG 24 hr tablet Take 100 mg by mouth daily, Historical      polyethylene glycol (MIRALAX/GLYCOLAX) powder Take 1 capful by mouth daily as needed for constipation , Historical      vitamin D3 (CHOLECALCIFEROL) 50 mcg (2000 units) tablet Take 50 mcg by mouth daily, Historical           Allergies   Allergies   Allergen Reactions     Ibuprofen Other (See Comments)     Told not to take due to bleeing     Innovar [Fentanyl-Droperidol] Difficulty breathing     Heavy chest     Sulfa Drugs Itching     Data   Most Recent 3 CBC's:  Recent Labs   Lab Test 05/25/21  1234 12/18/19  0849 12/17/19  0844   WBC 8.8 7.5 10.8   HGB 13.7 12.6 13.3   MCV 96 94 92    337 367      Most Recent 3 BMP's:  Recent Labs   Lab Test 05/25/21  1234 12/18/19  0849  12/17/19  0844    139 143   POTASSIUM 3.9 4.0 4.1   CHLORIDE 108 110* 112*   CO2 26 27 25   BUN 16 9 10   CR 0.77 0.71 0.65   ANIONGAP 5 2* 6   BARBER 10.0 9.6 10.0   * 81 111*     Most Recent 2 LFT's:  Recent Labs   Lab Test 05/25/21  1234 12/16/19  1538   AST 20 22   ALT 39 38   ALKPHOS 60 82   BILITOTAL 0.4 0.4     Most Recent INR's and Anticoagulation Dosing History:  Anticoagulation Dose History     There is no flowsheet data to display.        Most Recent 3 Troponin's:No lab results found.  Most Recent Cholesterol Panel:No lab results found.  Most Recent 6 Bacteria Isolates From Any Culture (See EPIC Reports for Culture Details):No lab results found.  Most Recent TSH, T4 and A1c Labs:No lab results found.  Results for orders placed or performed during the hospital encounter of 05/25/21   MR Brain w/o & w Contrast    Narrative    MRI OF THE BRAIN WITHOUT AND WITH CONTRAST 5/25/2021 2:27 PM     COMPARISON: Head CT 12/16/2019    HISTORY:  Altered mental status, confusion.     TECHNIQUE: Multi-sequence, multi-planar MRI images of the brain were  acquired before and after the administration of IV gadolinium (10 mL  Gadavist).    FINDINGS: The ventricles and basal cisterns are normal in  configuration. There is no midline shift. There are no extra-axial  fluid collections. Gray-white differentiation is well maintained.  There is no evidence for stroke or acute intracranial hemorrhage.  There is no abnormal contrast enhancement in the brain or its  coverings.    There is no sinusitis or mastoiditis.      Impression    IMPRESSION: Normal brain MRI.       BALWINDER LOZANO MD

## 2021-05-26 NOTE — PLAN OF CARE
A&Ox4, VSS on RA. Neuro's intact, no deficit. C/o headache prn Fioricet. Pt not able to recall yesterdays events coming to the hospital and MRI. MRI neg for stroke. SBA, comb diet. Plan: possible discharge today.

## 2021-05-26 NOTE — PROGRESS NOTES
-diagnostic tests and consults completed and resulted- Not met  -vital signs normal or at patient baseline- Partially met, hypertensive  Nurse to notify provider when observation goals have been met and patient is ready for discharge.

## 2021-05-26 NOTE — PROGRESS NOTES
AxOx4. VSS on RA. Independent in room. Regular diet. C/o headache this evening, Fioricet ordered. Forgetful and confused. Cannot remember this mornings events and why she is here, she knows its d/t another episode of amnesia. Does not remember here MRI or being in the ED that well. MRI negative for stroke. Neuros q4hrs, negative. PIV saline locked. Likely discharge tomorrow to previous living situation. Continue to monitor.

## 2021-05-26 NOTE — PLAN OF CARE
Pt A&Ox4; neuros intact except for the inability to recall events from yesterday leading up to hospital admission. VSS; RA. C/o headache rating it 3-4/10 but declined intervention. Tolerating PO. Voiding without difficulties. Discharge orders received; instructions reviewed with pt. All questions answered. A copy of discharge AVS given to pt; all belongings returned. Pt discharged from floor via w/c accompanied by NA. Family providing transportation to home.

## 2021-09-05 ENCOUNTER — HEALTH MAINTENANCE LETTER (OUTPATIENT)
Age: 71
End: 2021-09-05

## 2021-10-22 ENCOUNTER — APPOINTMENT (OUTPATIENT)
Dept: CT IMAGING | Facility: CLINIC | Age: 71
End: 2021-10-22
Attending: EMERGENCY MEDICINE
Payer: MEDICARE

## 2021-10-22 ENCOUNTER — HOSPITAL ENCOUNTER (EMERGENCY)
Facility: CLINIC | Age: 71
Discharge: HOME | End: 2021-10-22
Attending: EMERGENCY MEDICINE | Admitting: HOSPITALIST
Payer: MEDICARE

## 2021-10-22 VITALS
TEMPERATURE: 97.8 F | RESPIRATION RATE: 13 BRPM | OXYGEN SATURATION: 97 % | HEART RATE: 84 BPM | WEIGHT: 190 LBS | BODY MASS INDEX: 31.65 KG/M2 | SYSTOLIC BLOOD PRESSURE: 144 MMHG | DIASTOLIC BLOOD PRESSURE: 79 MMHG | HEIGHT: 65 IN

## 2021-10-22 DIAGNOSIS — K52.9 GASTROENTERITIS: ICD-10-CM

## 2021-10-22 DIAGNOSIS — K52.9 COLITIS: ICD-10-CM

## 2021-10-22 DIAGNOSIS — R19.7 NAUSEA VOMITING AND DIARRHEA: ICD-10-CM

## 2021-10-22 DIAGNOSIS — R11.2 NAUSEA VOMITING AND DIARRHEA: ICD-10-CM

## 2021-10-22 DIAGNOSIS — I48.91 ATRIAL FIBRILLATION WITH RVR (H): ICD-10-CM

## 2021-10-22 DIAGNOSIS — I48.0 PAROXYSMAL ATRIAL FIBRILLATION (H): ICD-10-CM

## 2021-10-22 LAB
ALBUMIN SERPL-MCNC: 4.3 G/DL (ref 3.4–5)
ALP SERPL-CCNC: 67 U/L (ref 40–150)
ALT SERPL W P-5'-P-CCNC: 38 U/L (ref 0–50)
ANION GAP SERPL CALCULATED.3IONS-SCNC: 11 MMOL/L (ref 3–14)
AST SERPL W P-5'-P-CCNC: 26 U/L (ref 0–45)
BASOPHILS # BLD AUTO: 0.1 10E3/UL (ref 0–0.2)
BASOPHILS NFR BLD AUTO: 1 %
BILIRUB SERPL-MCNC: 0.5 MG/DL (ref 0.2–1.3)
BUN SERPL-MCNC: 15 MG/DL (ref 7–30)
CALCIUM SERPL-MCNC: 10.4 MG/DL (ref 8.5–10.1)
CHLORIDE BLD-SCNC: 105 MMOL/L (ref 94–109)
CO2 SERPL-SCNC: 23 MMOL/L (ref 20–32)
CREAT SERPL-MCNC: 0.78 MG/DL (ref 0.52–1.04)
EOSINOPHIL # BLD AUTO: 0.1 10E3/UL (ref 0–0.7)
EOSINOPHIL NFR BLD AUTO: 1 %
ERYTHROCYTE [DISTWIDTH] IN BLOOD BY AUTOMATED COUNT: 19.9 % (ref 10–15)
GFR SERPL CREATININE-BSD FRML MDRD: 77 ML/MIN/1.73M2
GLUCOSE BLD-MCNC: 150 MG/DL (ref 70–99)
HCT VFR BLD AUTO: 41.2 % (ref 35–47)
HGB BLD-MCNC: 12.6 G/DL (ref 11.7–15.7)
IMM GRANULOCYTES # BLD: 0.1 10E3/UL
IMM GRANULOCYTES NFR BLD: 1 %
LACTATE SERPL-SCNC: 2.5 MMOL/L (ref 0.7–2)
LACTATE SERPL-SCNC: 2.8 MMOL/L (ref 0.7–2)
LIPASE SERPL-CCNC: 132 U/L (ref 73–393)
LYMPHOCYTES # BLD AUTO: 0.6 10E3/UL (ref 0.8–5.3)
LYMPHOCYTES NFR BLD AUTO: 6 %
MAGNESIUM SERPL-MCNC: 2.2 MG/DL (ref 1.6–2.3)
MCH RBC QN AUTO: 30.2 PG (ref 26.5–33)
MCHC RBC AUTO-ENTMCNC: 30.6 G/DL (ref 31.5–36.5)
MCV RBC AUTO: 99 FL (ref 78–100)
MONOCYTES # BLD AUTO: 0.5 10E3/UL (ref 0–1.3)
MONOCYTES NFR BLD AUTO: 5 %
NEUTROPHILS # BLD AUTO: 9.2 10E3/UL (ref 1.6–8.3)
NEUTROPHILS NFR BLD AUTO: 86 %
NRBC # BLD AUTO: 0 10E3/UL
NRBC BLD AUTO-RTO: 0 /100
PLATELET # BLD AUTO: 487 10E3/UL (ref 150–450)
POTASSIUM BLD-SCNC: 3.2 MMOL/L (ref 3.4–5.3)
PROT SERPL-MCNC: 8.7 G/DL (ref 6.8–8.8)
RBC # BLD AUTO: 4.17 10E6/UL (ref 3.8–5.2)
SARS-COV-2 RNA RESP QL NAA+PROBE: NEGATIVE
SODIUM SERPL-SCNC: 139 MMOL/L (ref 133–144)
T4 FREE SERPL-MCNC: 1.08 NG/DL (ref 0.76–1.46)
TROPONIN I SERPL-MCNC: <0.015 UG/L (ref 0–0.04)
TSH SERPL DL<=0.005 MIU/L-ACNC: 4.51 MU/L (ref 0.4–4)
WBC # BLD AUTO: 10.6 10E3/UL (ref 4–11)

## 2021-10-22 PROCEDURE — 84484 ASSAY OF TROPONIN QUANT: CPT | Performed by: EMERGENCY MEDICINE

## 2021-10-22 PROCEDURE — 84439 ASSAY OF FREE THYROXINE: CPT | Performed by: EMERGENCY MEDICINE

## 2021-10-22 PROCEDURE — 83735 ASSAY OF MAGNESIUM: CPT | Performed by: EMERGENCY MEDICINE

## 2021-10-22 PROCEDURE — 71275 CT ANGIOGRAPHY CHEST: CPT | Mod: MG

## 2021-10-22 PROCEDURE — 96365 THER/PROPH/DIAG IV INF INIT: CPT

## 2021-10-22 PROCEDURE — 96375 TX/PRO/DX INJ NEW DRUG ADDON: CPT | Mod: 59

## 2021-10-22 PROCEDURE — C9803 HOPD COVID-19 SPEC COLLECT: HCPCS

## 2021-10-22 PROCEDURE — 84443 ASSAY THYROID STIM HORMONE: CPT | Performed by: EMERGENCY MEDICINE

## 2021-10-22 PROCEDURE — 85025 COMPLETE CBC W/AUTO DIFF WBC: CPT | Performed by: EMERGENCY MEDICINE

## 2021-10-22 PROCEDURE — 93005 ELECTROCARDIOGRAM TRACING: CPT | Mod: 76

## 2021-10-22 PROCEDURE — 93005 ELECTROCARDIOGRAM TRACING: CPT

## 2021-10-22 PROCEDURE — 87635 SARS-COV-2 COVID-19 AMP PRB: CPT | Performed by: EMERGENCY MEDICINE

## 2021-10-22 PROCEDURE — 80053 COMPREHEN METABOLIC PANEL: CPT | Performed by: EMERGENCY MEDICINE

## 2021-10-22 PROCEDURE — 96361 HYDRATE IV INFUSION ADD-ON: CPT

## 2021-10-22 PROCEDURE — 96366 THER/PROPH/DIAG IV INF ADDON: CPT

## 2021-10-22 PROCEDURE — 83605 ASSAY OF LACTIC ACID: CPT | Mod: 91 | Performed by: EMERGENCY MEDICINE

## 2021-10-22 PROCEDURE — 250N000011 HC RX IP 250 OP 636: Performed by: EMERGENCY MEDICINE

## 2021-10-22 PROCEDURE — 258N000003 HC RX IP 258 OP 636: Performed by: EMERGENCY MEDICINE

## 2021-10-22 PROCEDURE — 87040 BLOOD CULTURE FOR BACTERIA: CPT | Performed by: EMERGENCY MEDICINE

## 2021-10-22 PROCEDURE — 99291 CRITICAL CARE FIRST HOUR: CPT | Mod: 25

## 2021-10-22 PROCEDURE — 83690 ASSAY OF LIPASE: CPT | Performed by: EMERGENCY MEDICINE

## 2021-10-22 PROCEDURE — 250N000009 HC RX 250: Performed by: EMERGENCY MEDICINE

## 2021-10-22 PROCEDURE — 36415 COLL VENOUS BLD VENIPUNCTURE: CPT | Performed by: EMERGENCY MEDICINE

## 2021-10-22 RX ORDER — ONDANSETRON 4 MG/1
4 TABLET, ORALLY DISINTEGRATING ORAL EVERY 8 HOURS PRN
Qty: 10 TABLET | Refills: 0 | Status: SHIPPED | OUTPATIENT
Start: 2021-10-22 | End: 2022-01-17

## 2021-10-22 RX ORDER — PIPERACILLIN SODIUM, TAZOBACTAM SODIUM 4; .5 G/20ML; G/20ML
4.5 INJECTION, POWDER, LYOPHILIZED, FOR SOLUTION INTRAVENOUS ONCE
Status: COMPLETED | OUTPATIENT
Start: 2021-10-22 | End: 2021-10-22

## 2021-10-22 RX ORDER — DILTIAZEM HYDROCHLORIDE 5 MG/ML
15 INJECTION INTRAVENOUS ONCE
Status: COMPLETED | OUTPATIENT
Start: 2021-10-22 | End: 2021-10-22

## 2021-10-22 RX ORDER — DILTIAZEM HYDROCHLORIDE 5 MG/ML
10 INJECTION INTRAVENOUS ONCE
Status: DISCONTINUED | OUTPATIENT
Start: 2021-10-22 | End: 2021-10-22

## 2021-10-22 RX ORDER — METOPROLOL TARTRATE 25 MG/1
25 TABLET, FILM COATED ORAL 2 TIMES DAILY
Qty: 60 TABLET | Refills: 0 | Status: SHIPPED | OUTPATIENT
Start: 2021-10-22 | End: 2021-10-22

## 2021-10-22 RX ORDER — LORAZEPAM 2 MG/ML
0.5 INJECTION INTRAMUSCULAR ONCE
Status: COMPLETED | OUTPATIENT
Start: 2021-10-22 | End: 2021-10-22

## 2021-10-22 RX ORDER — CYANOCOBALAMIN 1000 UG/ML
1 INJECTION, SOLUTION INTRAMUSCULAR; SUBCUTANEOUS
COMMUNITY

## 2021-10-22 RX ORDER — ONDANSETRON 2 MG/ML
4 INJECTION INTRAMUSCULAR; INTRAVENOUS ONCE
Status: COMPLETED | OUTPATIENT
Start: 2021-10-22 | End: 2021-10-22

## 2021-10-22 RX ORDER — METOPROLOL TARTRATE 25 MG/1
25 TABLET, FILM COATED ORAL ONCE
Status: DISCONTINUED | OUTPATIENT
Start: 2021-10-22 | End: 2021-10-22

## 2021-10-22 RX ORDER — IOPAMIDOL 755 MG/ML
91 INJECTION, SOLUTION INTRAVASCULAR ONCE
Status: COMPLETED | OUTPATIENT
Start: 2021-10-22 | End: 2021-10-22

## 2021-10-22 RX ORDER — DICYCLOMINE HCL 20 MG
20 TABLET ORAL 4 TIMES DAILY PRN
Qty: 20 TABLET | Refills: 0 | Status: SHIPPED | OUTPATIENT
Start: 2021-10-22 | End: 2022-01-17

## 2021-10-22 RX ORDER — ROSUVASTATIN CALCIUM 10 MG/1
10 TABLET, COATED ORAL DAILY
COMMUNITY
End: 2021-12-07

## 2021-10-22 RX ADMIN — ONDANSETRON 4 MG: 2 INJECTION INTRAMUSCULAR; INTRAVENOUS at 15:35

## 2021-10-22 RX ADMIN — IOPAMIDOL 91 ML: 755 INJECTION, SOLUTION INTRAVENOUS at 16:22

## 2021-10-22 RX ADMIN — LORAZEPAM 0.5 MG: 2 INJECTION INTRAMUSCULAR; INTRAVENOUS at 16:15

## 2021-10-22 RX ADMIN — SODIUM CHLORIDE 94 ML: 9 INJECTION, SOLUTION INTRAVENOUS at 16:22

## 2021-10-22 RX ADMIN — PIPERACILLIN SODIUM AND TAZOBACTAM SODIUM 4.5 G: 4; .5 INJECTION, POWDER, LYOPHILIZED, FOR SOLUTION INTRAVENOUS at 16:01

## 2021-10-22 RX ADMIN — SODIUM CHLORIDE 1000 ML: 9 INJECTION, SOLUTION INTRAVENOUS at 15:35

## 2021-10-22 RX ADMIN — DILTIAZEM HYDROCHLORIDE 15 MG: 5 INJECTION INTRAVENOUS at 15:55

## 2021-10-22 ASSESSMENT — ENCOUNTER SYMPTOMS
DYSURIA: 0
DIARRHEA: 1
FREQUENCY: 0
SHORTNESS OF BREATH: 0
VOMITING: 1
NAUSEA: 1

## 2021-10-22 ASSESSMENT — MIFFLIN-ST. JEOR: SCORE: 1377.71

## 2021-10-22 ASSESSMENT — ACTIVITIES OF DAILY LIVING (ADL): ADLS_ACUITY_SCORE: 12

## 2021-10-22 NOTE — ED PROVIDER NOTES
History   Chief Complaint:  Nausea, Vomiting, & Diarrhea     The history is provided by the patient.      Jacklyn Arriola is a 71 year old female with history of Crohn's disease who presents via EMS for evaluation of nausea, vomiting, and diarrhea. The patient reports that earlier today she was at [facility] with a family member when she had sudden onset nausea and had a episode of stool incontinence, Per EMS the stool was mostly formed but there was diarrhea as well. She states that she has had episodes of vomiting with this as well. EMS was called and she was given Zofran en route to the ED. Upon evaluation here she notes some mild diffuse abdominal pain but denies any dysuria, frequency, chest pain, shortness of breath, and any other known symptoms at this time.      Review of Systems   Respiratory: Negative for shortness of breath.    Cardiovascular: Negative for chest pain.   Gastrointestinal: Positive for diarrhea, nausea and vomiting.   Genitourinary: Negative for dysuria and frequency.   All other systems reviewed and are negative.    Allergies:  Ibuprofen  Innovar [Fentanyl-Droperidol]  Sulfa Drugs    Medications:  Esgic  Ferumoxytol  Remicade  Zestoretic  Mercaptopurine  Metoprolol Succinate  Polyethylene Glycol  Vitamin D3    Past Medical History:    Anemia  Crohn's disease  Hypertension  SBO  Iron deficiency anemia     Past Surgical History:    Hysterectomy    Social History:  The patient presents to the ED alone.    Physical Exam     Patient Vitals for the past 24 hrs:   BP Temp Temp src Pulse Resp SpO2 Height Weight   10/22/21 1820 134/70 -- -- 81 16 96 % -- --   10/22/21 1810 119/68 -- -- 80 17 97 % -- --   10/22/21 1800 136/71 -- -- 80 -- 97 % -- --   10/22/21 1750 121/81 -- -- 79 14 100 % -- --   10/22/21 1740 131/71 -- -- 92 17 97 % -- --   10/22/21 1730 (!) 133/124 -- -- 93 -- -- -- --   10/22/21 1720 -- -- -- 111 18 98 % -- --   10/22/21 1710 134/68 -- -- 107 18 98 % -- --   10/22/21 1700  "136/69 -- -- 105 19 99 % -- --   10/22/21 1650 116/84 -- -- 104 13 96 % -- --   10/22/21 1645 -- -- -- 101 18 94 % -- --   10/22/21 1641 -- -- -- 102 19 (!) 88 % -- --   10/22/21 1640 (!) 139/100 -- -- 87 -- -- -- --   10/22/21 1610 (!) 126/94 -- -- 101 26 96 % -- --   10/22/21 1600 110/56 -- -- 100 14 94 % -- --   10/22/21 1550 -- -- -- (!) 129 17 98 % -- --   10/22/21 1540 (!) 132/104 -- -- (!) 140 25 98 % -- --   10/22/21 1520 -- -- -- -- -- 99 % -- --   10/22/21 1510 116/65 -- -- -- -- 97 % -- --   10/22/21 1508 116/65 97.8  F (36.6  C) Oral 101 16 98 % 1.651 m (5' 5\") 86.2 kg (190 lb)       Physical Exam  General: Alert, appears elderly, otherwise well-developed and well-nourished. Cooperative.     In moderate distress  HEENT:  Head:  Atraumatic  Ears:  External ears are normal  Mouth/Throat:  Oropharynx is without erythema or exudate and mucous membranes are dry.   Eyes:   Conjunctivae normal and EOM are normal. No scleral icterus.  CV:  Tachycardic rate, irregular rhythm, normal heart sounds and radial pulses are 2+ and symmetric.  No murmur.  Resp:  Breath sounds are clear bilaterally    Non-labored, no retractions or accessory muscle use  GI:  Abdomen is soft, distension, no tenderness. No rebound or guarding.  No CVA tenderness bilaterally  MS:  Normal range of motion. No edema.    Normal strength in all 4 extremities.     Back atraumatic.    No midline cervical, thoracic, or lumbar tenderness  Skin:  Warm and dry.  No rash or lesions noted.  Neuro: Alert. Normal strength.  GCS: 15  Psych:  Normal mood and affect.    Emergency Department Course     ECG:  Indication: Tachycardia  Completed at 1512.  Read at 1518.   Atrial fibrillation with rapid ventriclar response. RSR' or QR pattern in V1 suggests right ventricular conduction delay. Marked ST abnormality, possible inferior subendocardial injury. Abnormal ECG. Significant changes from prior ECG (05/25/21).   Rate 134 bpm. CO interval *. QRS duration 90. " QT/QTc 344/513. P-R-T axes * 12 -3.    ECG:  Indication: Atrial Fibrillation Conversion  Completed at 1810.  Read at 1816.   Converted to normal sinus rhythm. Possible left atrial enlargement. RSR' or QR pattern in V1 suggests right ventricular conduction delay. Nonspecific ST Abnormality. Abnormal ECG.    Rate 81 bpm. CO interval 170. QRS duration 90. QT/QTc 40/464. P-R-T axes 31 11 10.    Imaging:  CT Abdomen Pelvis w Contrast:  1.  Mild colonic wall thickening consistent with colitis involving much of left hemicolon. No obstruction.   2.  No pulmonary embolism. Lungs are clear. Report per radiology     Laboratory:  CBC: WBC 10.6, HGB 12.6,  (H)    CMP: K 3.2 (L), Ca 10.4 (H), Glucose 150 (H), o/w WNL (Creat 0.78)    Lactic Acid (resulted 1541): 2.8 (H)    Lactic Acid (resulted 1810): 2.5 (H)    Lipase: 132    Magnesium: 2.2    Troponin: <0.015    TSH: 4.51 (H)    T4 Free: 1.08    Blood Culture (x2): Pending    Asymptomatic COVID-19 PCR: Negative    Emergency Department Course:  Reviewed:  I reviewed nursing notes, vitals and past medical history    Assessments:  1500 I performed a physical exam of the patient. Findings as above.     1550 Patient rechecked and updated.     1802 Patient rechecked and updated. Patient converted to sinus rhythm. Plan changed for discharge, this was discussed with patient and family who are in agreement.     Consults:   1752  I spoke with Dr. Narvaez of the Hospitalist service regarding patient's presentation, findings, and plan of care.    Interventions:  1535 NaCl 0.9% BOLUS 1000 mL IV  1535 Zofran 4 mg IV  1555 Diltiazem 15 mg IV  1601 Zosyn 4.5 g IV  1615 Lorazepam 0.5 mg IV    Disposition:  The patient was discharged to home.     Impression & Plan   Covid-19  Jacklyn Arriola was evaluated during a global COVID-19 pandemic, which necessitated consideration that the patient might be at risk for infection with the SARS-CoV-2 virus that causes COVID-19.   Applicable  protocols for evaluation were followed during the patient's care.   COVID-19 was considered as part of the patient's evaluation. The plan for testing is: a test was obtained during this visit.     Medical Decision Making:  Patient is a 71-year-old female who presents with nausea, vomiting, abdominal distention, and stool incontinence.  Patient does have a history of Crohn's disease as well as iron deficiency anemia.  She was at the [facility] when symptoms developed all of a sudden.  Patient was also found to be in atrial fibrillation with rapid ventricular rate on arrival in the emergency department.  A broad septic work-up ensued.  She had a CT scan of the chest abdomen pelvis and reassuringly no evidence of pulmonary embolism.  CT did show mild colonic wall thickening consistent with colitis of the left hemicolon.  There is thankfully no evidence of obstruction.  Blood work was relatively unremarkable except for an initial lactate elevation.  When the lactate initially resulted positive I did give a single dose of Zosyn out of concern this might have represented an infectious etiology.  After looking at the remainder of the work-up with CT imaging and the remainder of the blood work, I have very low concern for septic etiologies and likely suspect the lactate elevation was due to dehydration as well as the palpitations prehospital.  Patient did receive a dose of diltiazem while under my care and ultimately cardioverted into normal sinus rhythm.  Based on further history, it sounds like the patient has had paroxysmal atrial fibrillation historically and I suspect she'd benefit from referral to cardiology and/or electrophysiology for further discussion of long-term treatment of this.  Due to her history of Crohn's and frequent Crohn's flares I have a high concern starting her on blood thinner medications and so I would defer this conversation to cardiology.  We did not initiate blood thinner medications  tonight.  As patient converted to normal sinus rhythm and otherwise feels improved and able to tolerate oral intake, I think it is reasonable to discharge home with plans for close outpatient follow-up with cardiology and her primary care provider.  She is not having any abdominal tenderness.  She will be discharged with Zofran and Bentyl.  She also currently takes metoprolol and should continue with this.  Return precautions discussed for worsening palpitations, fever, or worsening abdominal pain.  After all questions answered & return precautions understood, discharged home.    Diagnosis:    ICD-10-CM    1. Gastroenteritis  K52.9    2. Colitis  K52.9    3. Nausea vomiting and diarrhea  R11.2     R19.7    4. Atrial fibrillation with RVR (H)  I48.91 Follow-Up with Cardiologist   5. Paroxysmal atrial fibrillation (H)  I48.0 Follow-Up with Cardiologist     Discharge Medications:  New Prescriptions    DICYCLOMINE (BENTYL) 20 MG TABLET    Take 1 tablet (20 mg) by mouth 4 times daily as needed (abdominal cramping)    ONDANSETRON (ZOFRAN ODT) 4 MG ODT TAB    Take 1 tablet (4 mg) by mouth every 8 hours as needed for nausea     Scribe Disclosure:  I, Jack Steve, am serving as a scribe at 2:50 PM on 10/22/2021 to document services personally performed by Francisco Hanley MD based on my observations and the provider's statements to me.     [facility]         Francisco Hanley MD  10/22/21 5549

## 2021-10-22 NOTE — PHARMACY-ADMISSION MEDICATION HISTORY
Pharmacy Medication History  Admission medication history interview status for the 10/22/2021  admission is complete. See EPIC admission navigator for prior to admission medications     Location of Interview: Patient room  Medication history sources: Patient and Surescripts    Significant changes made to the medication list:  1) Added B12 IM and artificial tears.   2) Patient is supposed to be taking Crestor 10mg daily but hadn't started yet (prescribed 10/20/21)      In the past week, patient estimated taking medication this percent of the time: greater than 90%    Additional medication history information:   1) Patient states she is supposed to take lisinopril/hctz twice daily but sometimes takes once daily.     Medication reconciliation completed by provider prior to medication history? No    Time spent in this activity: 20 minutes    Prior to Admission medications    Medication Sig Last Dose Taking? Auth Provider   butalbital-acetaminophen-caffeine (ESGIC) -40 MG tablet Take 1 tablet by mouth every 4 hours as needed prn med Yes Echo Purcell PA-C   cyanocobalamin (CYANOCOBALAMIN) 1000 MCG/ML injection Inject 1 mL into the muscle every 30 days due this week Yes Unknown, Entered By History   Ferumoxytol (FERAHEME IV) Inject into the vein as needed for low Hgb. Patient states frequency depends on labs. Last dose 10/21/21 and then another ~10/15/21 10/21/2021 at Unknown time Yes Unknown, Entered By History   hypromellose (ARTIFICIAL TEARS) 0.5 % SOLN ophthalmic solution Place 1 drop into both eyes 2 times daily 10/22/2021 at Unknown time Yes Unknown, Entered By History   inFLIXimab (REMICADE IV) Inject into the vein every 2 months 1 week ago Yes Unknown, Entered By History   lisinopril-hydrochlorothiazide (ZESTORETIC) 20-12.5 MG tablet Take 1 tablet by mouth 2 times daily Patient is prescribed to take twice daily but states she doesn't always take 2 doses a day. 10/22/2021 at Unknown time Yes Unknown,  Entered By History   mercaptopurine (PURINETHOL) 50 MG tablet Take 50 mg by mouth At Bedtime  10/21/2021 at pm Yes Unknown, Entered By History   metoprolol succinate ER (TOPROL-XL) 100 MG 24 hr tablet Take 100 mg by mouth daily 10/22/2021 at Unknown time Yes Unknown, Entered By History   polyethylene glycol (MIRALAX/GLYCOLAX) powder Take 1 capful by mouth daily as needed for constipation  prn med Yes Reported, Patient   vitamin D3 (CHOLECALCIFEROL) 50 mcg (2000 units) tablet Take 50 mcg by mouth daily 10/22/2021 at Unknown time Yes Unknown, Entered By History       The information provided in this note is only as accurate as the sources available at the time of update(s)

## 2021-10-23 LAB
ATRIAL RATE - MUSE: 81 BPM
DIASTOLIC BLOOD PRESSURE - MUSE: NORMAL MMHG
INTERPRETATION ECG - MUSE: NORMAL
P AXIS - MUSE: 31 DEGREES
PR INTERVAL - MUSE: 170 MS
QRS DURATION - MUSE: 90 MS
QT - MUSE: 400 MS
QTC - MUSE: 464 MS
R AXIS - MUSE: 11 DEGREES
SYSTOLIC BLOOD PRESSURE - MUSE: NORMAL MMHG
T AXIS - MUSE: 10 DEGREES
VENTRICULAR RATE- MUSE: 81 BPM

## 2021-10-27 LAB
BACTERIA BLD CULT: NO GROWTH
BACTERIA BLD CULT: NO GROWTH

## 2021-11-02 ENCOUNTER — TRANSFERRED RECORDS (OUTPATIENT)
Dept: HEALTH INFORMATION MANAGEMENT | Facility: CLINIC | Age: 71
End: 2021-11-02
Payer: MEDICARE

## 2021-12-07 ENCOUNTER — OFFICE VISIT (OUTPATIENT)
Dept: CARDIOLOGY | Facility: CLINIC | Age: 71
End: 2021-12-07
Payer: MEDICARE

## 2021-12-07 VITALS
HEART RATE: 71 BPM | DIASTOLIC BLOOD PRESSURE: 84 MMHG | WEIGHT: 200.3 LBS | SYSTOLIC BLOOD PRESSURE: 136 MMHG | BODY MASS INDEX: 33.33 KG/M2

## 2021-12-07 DIAGNOSIS — I48.91 ATRIAL FIBRILLATION WITH RVR (H): ICD-10-CM

## 2021-12-07 DIAGNOSIS — I48.0 PAROXYSMAL ATRIAL FIBRILLATION (H): ICD-10-CM

## 2021-12-07 PROCEDURE — 93000 ELECTROCARDIOGRAM COMPLETE: CPT | Performed by: INTERNAL MEDICINE

## 2021-12-07 PROCEDURE — 99214 OFFICE O/P EST MOD 30 MIN: CPT | Performed by: INTERNAL MEDICINE

## 2021-12-07 NOTE — LETTER
2021    [facility]  [address] [place] [address]    RE: Jacklyn RATLIFF Flako       Dear Colleague,     I had the pleasure of seeing Jacklyn Arriola in the [facility].  HPI and Plan:   Ms Fraser is a very pleasant 71-year-old female with history of Crohn's disease, history of iron deficiency anemia due to malabsorption on chronic IV iron replacement therapy, hypertension on hydrochlorothiazide and metoprolol who is now here for evaluation of recent episode of atrial fibrillation.  Back in 2021 patient was at [facility] [place].  Patient's son has taken her to [facility] [place] in celebration of patient's birthday which was a week early.  While at [facility] [place] patient noticed GI symptoms which is not uncommon with a history of Crohn's disease.  However this time the symptoms were quite profound she started throwing up ambulance was called she was brought to the ER where remarkably she was found to have atrial fibrillation with rapid ventricular rate.  She was given IV diltiazem and rhythm converted to sinus.  At the time of atrial fibrillation with rapid ventricular rate there was some diffuse ST depression.  Patient tells me that she was very surprised when she was told that she had atrial fibrillation as she never felt any palpitation or chest discomfort or shortness of breath during that episode.  Her chads 2 Vascor is 3 (age, gender, hypertension) and she was not started on any anticoagulation because of Crohn's disease and underlying iron deficiency anemia.  Today she is coming to cardiology clinic to establish cardiology care.  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  She does regular physical activity without any exertional symptoms.  One of her parents  because of aortic aneurysm.  No further details are noted.  She had an echocardiogram  that noted mild dilated ascending aorta.  She had several CT abdomen done without any mention of abdominal  aortic aneurysm.  She does not use any tobacco or alcohol.  No history of diabetes, stroke, myocardial infarction or congestive heart failure.  EKG today shows sinus rhythm with incomplete right bundle-branch.  Patient denies any bleeding issues.  Patient tells me that she was taking a different IV iron supplementation for past year or so and that was causing her to have frequent GI issues along with symptoms fluttering sensation in the chest short lasting.  Now she has been switched back to her previous iron IV regimen and has not experienced any GI issues of fluttering sensation.    Assessment plan  A pleasant 71-year-old female with newly diagnosed paroxysmal atrial fibrillation about 1-1/2 months ago.  Her chads 2 Vascor is 3.  Rhythm today is sinus.  I had a long discussion with patient regarding the pathophysiology of atrial fibrillation rate versus rhythm control strategy.  She is already on beta-blocker therapy for hypertension.  We discussed in detail about stroke prophylaxis.  Her chads 2 Vascor is 3 and usually oral anticoagulation is recommended in this setting.  She does have underlying iron deficient anemia not because of any bleeding issues but because of Crohn's disease causing malabsorption.  I discussed with patient the rationale and the potential risk of oral anticoagulation.  We discussed about choices of Coumadin versus newer oral anticoagulant agents.  Patient tells me that she understand the rational of anticoagulation in the increased risk of stroke in the absence of it but at this time she is not sure whether she wants to proceed with anticoagulation and she would like to think about it.  I recommend doing an echocardiogram to look at the LV structure left atrium as well as aorta size.  She had some ST depression when she was having rapid ventricular rate clinically she does not have any typical anginal symptoms.  I recommend stress testing.  Initially I recommend exercise stress testing but  patient tells me that she is not sure whether she will be able to exercise because of arthritis issue.  We will do a Lexiscan nuclear stress testing.  I am setting up a follow-up in about a month, we can go over the rules of echocardiogram, nuclear stress testing and will await patient decision regarding oral anticoagulation.  I educated the patient that she should not take any caffeine for 12 hours before the stress test.    1.  Newly diagnosed paroxysmal atrial fibrillation.  Chads vascular 3.  Rhythm is sinus.  2.  Diffuse ST depression at the time of rapid ventricular rate atrial fibrillation.  Clinically no anginal symptoms.  Troponin was undetectable at that time.  3.  Crohn's disease with iron deficiency anemia due to malabsorption on chronic IV iron supplementation.  4.  Personal history of ascending aorta aneurysm, mild dilated ascending aorta in 2006 echocardiogram.    Recommendations  Continue metoprolol  Echocardiogram  Lexiscan nuclear stress test for reasons noted above  Discussed in detail about oral anticoagulation and recommended same but patient has not yet decided.  Follow-up in a month    Orders Placed This Encounter   Procedures     Follow-Up with Cardiologist     EKG 12-lead complete w/read - Clinics (performed today)     Echocardiogram Complete       Orders Placed This Encounter   Medications     Ferric Carboxymaltose (INJECTAFER IV)       Medications Discontinued During This Encounter   Medication Reason     rosuvastatin (CRESTOR) 10 MG tablet Not filled/taken by Patient         Encounter Diagnoses   Name Primary?     Atrial fibrillation with RVR (H)      Paroxysmal atrial fibrillation (H)        CURRENT MEDICATIONS:  Current Outpatient Medications   Medication Sig Dispense Refill     butalbital-acetaminophen-caffeine (ESGIC) -40 MG tablet Take 1 tablet by mouth every 4 hours as needed 3 tablet 0     cyanocobalamin (CYANOCOBALAMIN) 1000 MCG/ML injection Inject 1 mL into the muscle every  30 days       Ferric Carboxymaltose (INJECTAFER IV)        hypromellose (ARTIFICIAL TEARS) 0.5 % SOLN ophthalmic solution Place 1 drop into both eyes 2 times daily       inFLIXimab (REMICADE IV) Inject into the vein every 2 months       lisinopril-hydrochlorothiazide (ZESTORETIC) 20-12.5 MG tablet Take 1 tablet by mouth 2 times daily Patient is prescribed to take twice daily but states she doesn't always take 2 doses a day.       mercaptopurine (PURINETHOL) 50 MG tablet Take 50 mg by mouth At Bedtime        metoprolol succinate ER (TOPROL-XL) 100 MG 24 hr tablet Take 100 mg by mouth daily       polyethylene glycol (MIRALAX/GLYCOLAX) powder Take 1 capful by mouth daily as needed for constipation        vitamin D3 (CHOLECALCIFEROL) 50 mcg (2000 units) tablet Take 50 mcg by mouth daily       dicyclomine (BENTYL) 20 MG tablet Take 1 tablet (20 mg) by mouth 4 times daily as needed (abdominal cramping) 20 tablet 0     Ferumoxytol (FERAHEME IV) Inject into the vein as needed for low Hgb. Patient states frequency depends on labs. Last dose 10/21/21 and then another ~10/15/21 (Patient not taking: Reported on 12/7/2021)       ondansetron (ZOFRAN ODT) 4 MG ODT tab Take 1 tablet (4 mg) by mouth every 8 hours as needed for nausea 10 tablet 0       ALLERGIES     Allergies   Allergen Reactions     Ibuprofen Other (See Comments)     Told not to take due to bleeing     Innovar [Fentanyl-Droperidol] Difficulty breathing     Heavy chest     Sulfa Drugs Itching       PAST MEDICAL HISTORY:  Past Medical History:   Diagnosis Date     Anemia      Crohn's disease (H)      Hypertension        PAST SURGICAL HISTORY:  Past Surgical History:   Procedure Laterality Date     GYN SURGERY      hysterectomy       FAMILY HISTORY:  Family History   Problem Relation Age of Onset     Parkinsonism Mother      Hypertension Mother      Hypertension Father      Hypertension Sister      Hyperlipidemia Sister      Unknown/Adopted Daughter        SOCIAL  HISTORY:  Social History     Socioeconomic History     Marital status:      Spouse name: None     Number of children: None     Years of education: None     Highest education level: None   Occupational History     None   Tobacco Use     Smoking status: Never Smoker     Smokeless tobacco: Never Used   Substance and Sexual Activity     Alcohol use: Never     Drug use: Never     Sexual activity: None   Other Topics Concern     Parent/sibling w/ CABG, MI or angioplasty before 65F 55M? Not Asked   Social History Narrative     None     Social Determinants of Health     Financial Resource Strain: Not on file   Food Insecurity: Not on file   Transportation Needs: Not on file   Physical Activity: Not on file   Stress: Not on file   Social Connections: Not on file   Intimate Partner Violence: Not on file   Housing Stability: Not on file       Review of Systems:  Skin:  Negative bruising     Eyes:  Positive for glasses    ENT:  Negative      Respiratory:  Negative       Cardiovascular: Denies any exertional chest discomfort dizziness presyncope syncope   gastroenterology: Positive for constipation;diarrhea crohns disease  Genitourinary:  Positive for   recent UTI, completed UTI  Musculoskeletal:  Negative      Neurologic:  Positive for migraine headaches    Psychiatric:  Negative      Heme/Lymph/Imm:  Positive for anemia;easy bruising    Endocrine:  Positive for   thyroid nodules    Physical Exam:  Vitals: /84 (BP Location: Left arm, Cuff Size: Adult Large)   Pulse 71   Wt 90.9 kg (200 lb 4.8 oz)   BMI 33.33 kg/m      General patient appears comfortable  Neck normal JVP  Cardiovascular system S1-S2 normal no murmur rub or gallop  Respiratory system clear to auscultation  Extremities no edema  Neurological alert, oriented      Lito Warren MD   [facility]    cc:   Farncisco Hanley MD  [facility]  [address]    [place]  [address]

## 2021-12-07 NOTE — PROGRESS NOTES
HPI and Plan:   Ms Fraser is a very pleasant 71-year-old female with history of Crohn's disease, history of iron deficiency anemia due to malabsorption on chronic IV iron replacement therapy, hypertension on hydrochlorothiazide and metoprolol who is now here for evaluation of recent episode of atrial fibrillation.  Back in 2021 patient was at [facility].  Patient's son has taken her to [facility] in celebration of patient's birthday which was a week early.  While at [facility] patient noticed GI symptoms which is not uncommon with a history of Crohn's disease.  However this time the symptoms were quite profound she started throwing up ambulance was called she was brought to the ER where remarkably she was found to have atrial fibrillation with rapid ventricular rate.  She was given IV diltiazem and rhythm converted to sinus.  At the time of atrial fibrillation with rapid ventricular rate there was some diffuse ST depression.  Patient tells me that she was very surprised when she was told that she had atrial fibrillation as she never felt any palpitation or chest discomfort or shortness of breath during that episode.  Her chads 2 Vascor is 3 (age, gender, hypertension) and she was not started on any anticoagulation because of Crohn's disease and underlying iron deficiency anemia.  Today she is coming to cardiology clinic to establish cardiology care.  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  She does regular physical activity without any exertional symptoms.  One of her parents  because of aortic aneurysm.  No further details are noted.  She had an echocardiogram  that noted mild dilated ascending aorta.  She had several CT abdomen done without any mention of abdominal aortic aneurysm.  She does not use any tobacco or alcohol.  No history of diabetes, stroke, myocardial infarction or congestive heart failure.  EKG today shows sinus rhythm with incomplete right bundle-branch.  Patient  denies any bleeding issues.  Patient tells me that she was taking a different IV iron supplementation for past year or so and that was causing her to have frequent GI issues along with symptoms fluttering sensation in the chest short lasting.  Now she has been switched back to her previous iron IV regimen and has not experienced any GI issues of fluttering sensation.    Assessment plan  A pleasant 71-year-old female with newly diagnosed paroxysmal atrial fibrillation about 1-1/2 months ago.  Her chads 2 Vascor is 3.  Rhythm today is sinus.  I had a long discussion with patient regarding the pathophysiology of atrial fibrillation rate versus rhythm control strategy.  She is already on beta-blocker therapy for hypertension.  We discussed in detail about stroke prophylaxis.  Her chads 2 Vascor is 3 and usually oral anticoagulation is recommended in this setting.  She does have underlying iron deficient anemia not because of any bleeding issues but because of Crohn's disease causing malabsorption.  I discussed with patient the rationale and the potential risk of oral anticoagulation.  We discussed about choices of Coumadin versus newer oral anticoagulant agents.  Patient tells me that she understand the rational of anticoagulation in the increased risk of stroke in the absence of it but at this time she is not sure whether she wants to proceed with anticoagulation and she would like to think about it.  I recommend doing an echocardiogram to look at the LV structure left atrium as well as aorta size.  She had some ST depression when she was having rapid ventricular rate clinically she does not have any typical anginal symptoms.  I recommend stress testing.  Initially I recommend exercise stress testing but patient tells me that she is not sure whether she will be able to exercise because of arthritis issue.  We will do a Lexiscan nuclear stress testing.  I am setting up a follow-up in about a month, we can go over the  rules of echocardiogram, nuclear stress testing and will await patient decision regarding oral anticoagulation.  I educated the patient that she should not take any caffeine for 12 hours before the stress test.    1.  Newly diagnosed paroxysmal atrial fibrillation.  Chads vascular 3.  Rhythm is sinus.  2.  Diffuse ST depression at the time of rapid ventricular rate atrial fibrillation.  Clinically no anginal symptoms.  Troponin was undetectable at that time.  3.  Crohn's disease with iron deficiency anemia due to malabsorption on chronic IV iron supplementation.  4.  Personal history of ascending aorta aneurysm, mild dilated ascending aorta in 2006 echocardiogram.    Recommendations  Continue metoprolol  Echocardiogram  Lexiscan nuclear stress test for reasons noted above  Discussed in detail about oral anticoagulation and recommended same but patient has not yet decided.  Follow-up in a month    Orders Placed This Encounter   Procedures     Follow-Up with Cardiologist     EKG 12-lead complete w/read - Clinics (performed today)     Echocardiogram Complete       Orders Placed This Encounter   Medications     Ferric Carboxymaltose (INJECTAFER IV)       Medications Discontinued During This Encounter   Medication Reason     rosuvastatin (CRESTOR) 10 MG tablet Not filled/taken by Patient         Encounter Diagnoses   Name Primary?     Atrial fibrillation with RVR (H)      Paroxysmal atrial fibrillation (H)        CURRENT MEDICATIONS:  Current Outpatient Medications   Medication Sig Dispense Refill     butalbital-acetaminophen-caffeine (ESGIC) -40 MG tablet Take 1 tablet by mouth every 4 hours as needed 3 tablet 0     cyanocobalamin (CYANOCOBALAMIN) 1000 MCG/ML injection Inject 1 mL into the muscle every 30 days       Ferric Carboxymaltose (INJECTAFER IV)        hypromellose (ARTIFICIAL TEARS) 0.5 % SOLN ophthalmic solution Place 1 drop into both eyes 2 times daily       inFLIXimab (REMICADE IV) Inject into the vein  every 2 months       lisinopril-hydrochlorothiazide (ZESTORETIC) 20-12.5 MG tablet Take 1 tablet by mouth 2 times daily Patient is prescribed to take twice daily but states she doesn't always take 2 doses a day.       mercaptopurine (PURINETHOL) 50 MG tablet Take 50 mg by mouth At Bedtime        metoprolol succinate ER (TOPROL-XL) 100 MG 24 hr tablet Take 100 mg by mouth daily       polyethylene glycol (MIRALAX/GLYCOLAX) powder Take 1 capful by mouth daily as needed for constipation        vitamin D3 (CHOLECALCIFEROL) 50 mcg (2000 units) tablet Take 50 mcg by mouth daily       dicyclomine (BENTYL) 20 MG tablet Take 1 tablet (20 mg) by mouth 4 times daily as needed (abdominal cramping) 20 tablet 0     Ferumoxytol (FERAHEME IV) Inject into the vein as needed for low Hgb. Patient states frequency depends on labs. Last dose 10/21/21 and then another ~10/15/21 (Patient not taking: Reported on 12/7/2021)       ondansetron (ZOFRAN ODT) 4 MG ODT tab Take 1 tablet (4 mg) by mouth every 8 hours as needed for nausea 10 tablet 0       ALLERGIES     Allergies   Allergen Reactions     Ibuprofen Other (See Comments)     Told not to take due to bleeing     Innovar [Fentanyl-Droperidol] Difficulty breathing     Heavy chest     Sulfa Drugs Itching       PAST MEDICAL HISTORY:  Past Medical History:   Diagnosis Date     Anemia      Crohn's disease (H)      Hypertension        PAST SURGICAL HISTORY:  Past Surgical History:   Procedure Laterality Date     GYN SURGERY      hysterectomy       FAMILY HISTORY:  Family History   Problem Relation Age of Onset     Parkinsonism Mother      Hypertension Mother      Hypertension Father      Hypertension Sister      Hyperlipidemia Sister      Unknown/Adopted Daughter        SOCIAL HISTORY:  Social History     Socioeconomic History     Marital status:      Spouse name: None     Number of children: None     Years of education: None     Highest education level: None   Occupational History      None   Tobacco Use     Smoking status: Never Smoker     Smokeless tobacco: Never Used   Substance and Sexual Activity     Alcohol use: Never     Drug use: Never     Sexual activity: None   Other Topics Concern     Parent/sibling w/ CABG, MI or angioplasty before 65F 55M? Not Asked   Social History Narrative     None     Social Determinants of Health     Financial Resource Strain: Not on file   Food Insecurity: Not on file   Transportation Needs: Not on file   Physical Activity: Not on file   Stress: Not on file   Social Connections: Not on file   Intimate Partner Violence: Not on file   Housing Stability: Not on file       Review of Systems:  Skin:  Negative bruising     Eyes:  Positive for glasses    ENT:  Negative      Respiratory:  Negative       Cardiovascular: Denies any exertional chest discomfort dizziness presyncope syncope   gastroenterology: Positive for constipation;diarrhea crohns disease  Genitourinary:  Positive for   recent UTI, completed UTI  Musculoskeletal:  Negative      Neurologic:  Positive for migraine headaches    Psychiatric:  Negative      Heme/Lymph/Imm:  Positive for anemia;easy bruising    Endocrine:  Positive for   thyroid nodules    Physical Exam:  Vitals: /84 (BP Location: Left arm, Cuff Size: Adult Large)   Pulse 71   Wt 90.9 kg (200 lb 4.8 oz)   BMI 33.33 kg/m      General patient appears comfortable  Neck normal JVP  Cardiovascular system S1-S2 normal no murmur rub or gallop  Respiratory system clear to auscultation  Extremities no edema  Neurological alert, oriented      CC  Francisco Hanley MD  [facility]  [address] [place] [address]

## 2021-12-17 ENCOUNTER — HOSPITAL ENCOUNTER (OUTPATIENT)
Dept: CARDIOLOGY | Facility: CLINIC | Age: 71
Discharge: HOME | End: 2021-12-17
Attending: INTERNAL MEDICINE | Admitting: INTERNAL MEDICINE
Payer: MEDICARE

## 2021-12-17 DIAGNOSIS — I48.0 PAROXYSMAL ATRIAL FIBRILLATION (H): ICD-10-CM

## 2021-12-17 LAB — LVEF ECHO: NORMAL

## 2021-12-17 PROCEDURE — 93306 TTE W/DOPPLER COMPLETE: CPT

## 2021-12-17 PROCEDURE — 93306 TTE W/DOPPLER COMPLETE: CPT | Mod: 26 | Performed by: INTERNAL MEDICINE

## 2022-01-03 ENCOUNTER — HOSPITAL ENCOUNTER (OUTPATIENT)
Dept: CARDIOLOGY | Facility: CLINIC | Age: 72
End: 2022-01-03
Attending: INTERNAL MEDICINE
Payer: MEDICARE

## 2022-01-03 ENCOUNTER — TELEPHONE (OUTPATIENT)
Dept: CARDIOLOGY | Facility: CLINIC | Age: 72
End: 2022-01-03

## 2022-01-03 VITALS
BODY MASS INDEX: 33.55 KG/M2 | HEART RATE: 75 BPM | DIASTOLIC BLOOD PRESSURE: 82 MMHG | OXYGEN SATURATION: 98 % | HEIGHT: 65 IN | SYSTOLIC BLOOD PRESSURE: 140 MMHG | WEIGHT: 201.4 LBS

## 2022-01-03 DIAGNOSIS — I48.0 PAROXYSMAL ATRIAL FIBRILLATION (H): ICD-10-CM

## 2022-01-03 LAB
CV STRESS MAX HR HE: 99
NUC STRESS EJECTION FRACTION: 59 %
RATE PRESSURE PRODUCT: NORMAL
STRESS ECHO BASELINE DIASTOLIC HE: 82
STRESS ECHO BASELINE HR: 75 BPM
STRESS ECHO BASELINE SYSTOLIC BP: 140
STRESS ECHO CALCULATED PERCENT HR: 66 %
STRESS ECHO LAST STRESS DIASTOLIC BP: 76
STRESS ECHO LAST STRESS SYSTOLIC BP: 136
STRESS ECHO TARGET HR: 149

## 2022-01-03 PROCEDURE — A9502 TC99M TETROFOSMIN: HCPCS | Performed by: INTERNAL MEDICINE

## 2022-01-03 PROCEDURE — 250N000011 HC RX IP 250 OP 636: Performed by: INTERNAL MEDICINE

## 2022-01-03 PROCEDURE — 93017 CV STRESS TEST TRACING ONLY: CPT | Mod: MG

## 2022-01-03 PROCEDURE — 93016 CV STRESS TEST SUPVJ ONLY: CPT | Performed by: INTERNAL MEDICINE

## 2022-01-03 PROCEDURE — G1004 CDSM NDSC: HCPCS | Performed by: INTERNAL MEDICINE

## 2022-01-03 PROCEDURE — 93018 CV STRESS TEST I&R ONLY: CPT | Mod: MG | Performed by: INTERNAL MEDICINE

## 2022-01-03 PROCEDURE — 78452 HT MUSCLE IMAGE SPECT MULT: CPT | Mod: 26 | Performed by: INTERNAL MEDICINE

## 2022-01-03 PROCEDURE — 343N000001 HC RX 343: Performed by: INTERNAL MEDICINE

## 2022-01-03 RX ORDER — ALBUTEROL SULFATE 90 UG/1
2 AEROSOL, METERED RESPIRATORY (INHALATION) EVERY 5 MIN PRN
Status: DISCONTINUED | OUTPATIENT
Start: 2022-01-03 | End: 2022-01-04 | Stop reason: HOSPADM

## 2022-01-03 RX ORDER — CAFFEINE CITRATE 20 MG/ML
60 SOLUTION INTRAVENOUS
Status: DISCONTINUED | OUTPATIENT
Start: 2022-01-03 | End: 2022-01-04 | Stop reason: HOSPADM

## 2022-01-03 RX ORDER — AMINOPHYLLINE 25 MG/ML
50-100 INJECTION, SOLUTION INTRAVENOUS
Status: DISCONTINUED | OUTPATIENT
Start: 2022-01-03 | End: 2022-01-04 | Stop reason: HOSPADM

## 2022-01-03 RX ORDER — REGADENOSON 0.08 MG/ML
0.4 INJECTION, SOLUTION INTRAVENOUS ONCE
Status: COMPLETED | OUTPATIENT
Start: 2022-01-03 | End: 2022-01-03

## 2022-01-03 RX ORDER — ACYCLOVIR 200 MG/1
0-1 CAPSULE ORAL
Status: DISCONTINUED | OUTPATIENT
Start: 2022-01-03 | End: 2022-01-04 | Stop reason: HOSPADM

## 2022-01-03 RX ADMIN — REGADENOSON 0.4 MG: 0.08 INJECTION, SOLUTION INTRAVENOUS at 10:20

## 2022-01-03 RX ADMIN — TETROFOSMIN 12.19 MCI.: 1.38 INJECTION, POWDER, LYOPHILIZED, FOR SOLUTION INTRAVENOUS at 10:23

## 2022-01-03 RX ADMIN — TETROFOSMIN 3.7 MCI.: 1.38 INJECTION, POWDER, LYOPHILIZED, FOR SOLUTION INTRAVENOUS at 08:58

## 2022-01-03 ASSESSMENT — MIFFLIN-ST. JEOR: SCORE: 1429.42

## 2022-01-03 NOTE — TELEPHONE ENCOUNTER
Spoke with Jacklyn via phone.  Informed her of results of her Lexiscan stress test today.      Jacklyn expressed thanks for the report of what is happening.  She states she has not had any symptoms of chest pain\pressure, shortness of breath, fatigue, or anything else unusual.  Jacklyn said she has been feeling fine and continues this way.    I explained to Jacklyn that we would have Dr. Warren weigh in on these results, and if he thinks she would need to come in sooner than current appointment of 1\17\22 with Jaylene Watson, then we will call her back and let her know this.   Writer also instructed her that if she does become symptomatic, that she is to call 911 right away, and not to drive herself to the hospital.    Writer also informed her she can call us with any questions\concerns or send us [email or web address] message.    Jacklyn verbalized understanding.

## 2022-01-04 NOTE — TELEPHONE ENCOUNTER
Mild ischemia noted.  Clinically she does not have any anginal symptoms.  Agree with recommended follow-up as scheduled.    Thank you  Lito

## 2022-01-17 ENCOUNTER — OFFICE VISIT (OUTPATIENT)
Dept: CARDIOLOGY | Facility: CLINIC | Age: 72
End: 2022-01-17
Payer: MEDICARE

## 2022-01-17 ENCOUNTER — LAB (OUTPATIENT)
Dept: LAB | Facility: CLINIC | Age: 72
End: 2022-01-17
Payer: MEDICARE

## 2022-01-17 VITALS
BODY MASS INDEX: 33.38 KG/M2 | DIASTOLIC BLOOD PRESSURE: 72 MMHG | HEART RATE: 86 BPM | WEIGHT: 200.6 LBS | SYSTOLIC BLOOD PRESSURE: 130 MMHG

## 2022-01-17 DIAGNOSIS — K50.90 CROHN'S DISEASE WITHOUT COMPLICATION, UNSPECIFIED GASTROINTESTINAL TRACT LOCATION (H): ICD-10-CM

## 2022-01-17 DIAGNOSIS — E78.5 DYSLIPIDEMIA, GOAL LDL BELOW 70: ICD-10-CM

## 2022-01-17 DIAGNOSIS — R94.39 ABNORMAL CARDIOVASCULAR STRESS TEST: Primary | ICD-10-CM

## 2022-01-17 DIAGNOSIS — I48.0 PAROXYSMAL ATRIAL FIBRILLATION (H): ICD-10-CM

## 2022-01-17 LAB
ALT SERPL W P-5'-P-CCNC: 42 U/L (ref 0–50)
CHOLEST SERPL-MCNC: 216 MG/DL
FASTING STATUS PATIENT QL REPORTED: NO
HDLC SERPL-MCNC: 43 MG/DL
LDLC SERPL CALC-MCNC: 116 MG/DL
NONHDLC SERPL-MCNC: 173 MG/DL
TRIGL SERPL-MCNC: 283 MG/DL

## 2022-01-17 PROCEDURE — 99215 OFFICE O/P EST HI 40 MIN: CPT | Performed by: PHYSICIAN ASSISTANT

## 2022-01-17 PROCEDURE — 80061 LIPID PANEL: CPT | Performed by: PHYSICIAN ASSISTANT

## 2022-01-17 PROCEDURE — 36415 COLL VENOUS BLD VENIPUNCTURE: CPT | Performed by: PHYSICIAN ASSISTANT

## 2022-01-17 PROCEDURE — 84460 ALANINE AMINO (ALT) (SGPT): CPT | Performed by: PHYSICIAN ASSISTANT

## 2022-01-17 RX ORDER — ROSUVASTATIN CALCIUM 10 MG/1
10 TABLET, COATED ORAL DAILY
COMMUNITY
Start: 2022-01-17 | End: 2022-10-10

## 2022-01-17 NOTE — PROGRESS NOTES
[id number]  40 minutes spent on the date of the encounter doing chart review, review of test results, interpretation of tests, patient visit and documentation     HPI and Plan:   See dictation    Orders this Visit:  Orders Placed This Encounter   Procedures     Lipid Profile     ALT     Lipid Profile     ALT     Follow-Up with Cardiac Advanced Practice Provider     Orders Placed This Encounter   Medications     rosuvastatin (CRESTOR) 10 MG tablet     Sig: Take 1 tablet (10 mg) by mouth daily     Medications Discontinued During This Encounter   Medication Reason     Ferumoxytol (FERAHEME IV)      ondansetron (ZOFRAN ODT) 4 MG ODT tab      dicyclomine (BENTYL) 20 MG tablet          Encounter Diagnoses   Name Primary?     Abnormal cardiovascular stress test Yes     Dyslipidemia, goal LDL below 70      Crohn's disease without complication, unspecified gastrointestinal tract location (H)      Paroxysmal atrial fibrillation (H)        CURRENT MEDICATIONS:  Current Outpatient Medications   Medication Sig Dispense Refill     butalbital-acetaminophen-caffeine (ESGIC) -40 MG tablet Take 1 tablet by mouth every 4 hours as needed 3 tablet 0     cyanocobalamin (CYANOCOBALAMIN) 1000 MCG/ML injection Inject 1 mL into the muscle every 30 days       Ferric Carboxymaltose (INJECTAFER IV)        hypromellose (ARTIFICIAL TEARS) 0.5 % SOLN ophthalmic solution Place 1 drop into both eyes 2 times daily       inFLIXimab (REMICADE IV) Inject into the vein every 2 months       lisinopril-hydrochlorothiazide (ZESTORETIC) 20-12.5 MG tablet Take 1 tablet by mouth 2 times daily Patient is prescribed to take twice daily but states she doesn't always take 2 doses a day.       mercaptopurine (PURINETHOL) 50 MG tablet Take 50 mg by mouth At Bedtime        metoprolol succinate ER (TOPROL-XL) 100 MG 24 hr tablet Take 100 mg by mouth daily       polyethylene glycol (MIRALAX/GLYCOLAX) powder Take 1 capful by mouth daily as needed for constipation         rosuvastatin (CRESTOR) 10 MG tablet Take 1 tablet (10 mg) by mouth daily       vitamin D3 (CHOLECALCIFEROL) 50 mcg (2000 units) tablet Take 50 mcg by mouth daily         ALLERGIES     Allergies   Allergen Reactions     Ibuprofen Other (See Comments)     Told not to take due to bleeing     Innovar [Fentanyl-Droperidol] Difficulty breathing     Heavy chest     Sulfa Drugs Itching       PAST MEDICAL HISTORY:  Past Medical History:   Diagnosis Date     Anemia      Ascending aorta dilation (H)     Mild, History of on Echo 2006     Atrial fibrillation with RVR (H) 10/22/2021     Crohn's disease (H)      Crohn's disease of small intestine without complication (H) 04/07/2016     HTN (hypertension)      Hypertension      Iron deficiency anemia due to chronic blood loss 10/02/2015     Diagnosis updated by automated process. Provider to review and confirm.     Malabsorption 07/15/2016     PAF (paroxysmal atrial fibrillation) (H)        PAST SURGICAL HISTORY:  Past Surgical History:   Procedure Laterality Date     GYN SURGERY      hysterectomy       FAMILY HISTORY:  Family History   Problem Relation Age of Onset     Parkinsonism Mother      Hypertension Mother      Hypertension Father      Hypertension Sister      Hyperlipidemia Sister      Unknown/Adopted Daughter        SOCIAL HISTORY:  Social History     Socioeconomic History     Marital status:      Spouse name: None     Number of children: None     Years of education: None     Highest education level: None   Occupational History     None   Tobacco Use     Smoking status: Never Smoker     Smokeless tobacco: Never Used   Substance and Sexual Activity     Alcohol use: Never     Drug use: Never     Sexual activity: None   Other Topics Concern     Parent/sibling w/ CABG, MI or angioplasty before 65F 55M? Not Asked   Social History Narrative     None     Social Determinants of Health     Financial Resource Strain: Not on file   Food Insecurity: Not on file    Transportation Needs: Not on file   Physical Activity: Not on file   Stress: Not on file   Social Connections: Not on file   Intimate Partner Violence: Not on file   Housing Stability: Not on file       Review of Systems:  Skin:  Negative     Eyes:  Negative    ENT:  Negative    Respiratory:  Negative shortness of breath;dyspnea on exertion;cough  Cardiovascular:  chest pain;Negative;edema;dizziness;lightheadedness Positive for;palpitations;fatigue  Gastroenterology: Negative    Genitourinary:  Negative    Musculoskeletal:  Negative    Neurologic:  Negative headaches  Psychiatric:       Heme/Lymph/Imm:  Positive for allergies  Endocrine:  Negative      Physical Exam:  Vitals: /72   Pulse 86   Wt 91 kg (200 lb 9.6 oz)   BMI 33.38 kg/m     Please refer to dictation for physical exam    Recent Lab Results: all reviewed today  CBC RESULTS:  Lab Results   Component Value Date    WBC 10.6 10/22/2021    WBC 8.8 05/25/2021    RBC 4.17 10/22/2021    RBC 4.38 05/25/2021    HGB 12.6 10/22/2021    HGB 13.7 05/25/2021    HCT 41.2 10/22/2021    HCT 42.1 05/25/2021    MCV 99 10/22/2021    MCV 96 05/25/2021    MCH 30.2 10/22/2021    MCH 31.3 05/25/2021    MCHC 30.6 (L) 10/22/2021    MCHC 32.5 05/25/2021    RDW 19.9 (H) 10/22/2021    RDW 17.2 (H) 05/25/2021     (H) 10/22/2021     05/25/2021       BMP RESULTS:  Lab Results   Component Value Date     10/22/2021     05/25/2021    POTASSIUM 3.2 (L) 10/22/2021    POTASSIUM 3.9 05/25/2021    CHLORIDE 105 10/22/2021    CHLORIDE 108 05/25/2021    CO2 23 10/22/2021    CO2 26 05/25/2021    ANIONGAP 11 10/22/2021    ANIONGAP 5 05/25/2021     (H) 10/22/2021     (H) 05/25/2021    BUN 15 10/22/2021    BUN 16 05/25/2021    CR 0.78 10/22/2021    CR 0.77 05/25/2021    GFRESTIMATED 77 10/22/2021    GFRESTIMATED 78 05/25/2021    GFRESTBLACK >90 05/25/2021    BARBER 10.4 (H) 10/22/2021    BARBER 10.0 05/25/2021        INR RESULTS:  No results found for:  INR        CC  No referring provider defined for this encounter.

## 2022-01-17 NOTE — LETTER
1/17/2022    [facility]  [address] [place] [address]    RE: Jacklyn Arriola       Dear Colleague,     I had the pleasure of seeing Jacklyn Arriola in the [facility].  [id number]  40 minutes spent on the date of the encounter doing chart review, review of test results, interpretation of tests, patient visit and documentation     HPI and Plan:   See dictation    Orders this Visit:  Orders Placed This Encounter   Procedures     Lipid Profile     ALT     Lipid Profile     ALT     Follow-Up with Cardiac Advanced Practice Provider     Orders Placed This Encounter   Medications     rosuvastatin (CRESTOR) 10 MG tablet     Sig: Take 1 tablet (10 mg) by mouth daily     Medications Discontinued During This Encounter   Medication Reason     Ferumoxytol (FERAHEME IV)      ondansetron (ZOFRAN ODT) 4 MG ODT tab      dicyclomine (BENTYL) 20 MG tablet          Encounter Diagnoses   Name Primary?     Abnormal cardiovascular stress test Yes     Dyslipidemia, goal LDL below 70      Crohn's disease without complication, unspecified gastrointestinal tract location (H)      Paroxysmal atrial fibrillation (H)        CURRENT MEDICATIONS:  Current Outpatient Medications   Medication Sig Dispense Refill     butalbital-acetaminophen-caffeine (ESGIC) -40 MG tablet Take 1 tablet by mouth every 4 hours as needed 3 tablet 0     cyanocobalamin (CYANOCOBALAMIN) 1000 MCG/ML injection Inject 1 mL into the muscle every 30 days       Ferric Carboxymaltose (INJECTAFER IV)        hypromellose (ARTIFICIAL TEARS) 0.5 % SOLN ophthalmic solution Place 1 drop into both eyes 2 times daily       inFLIXimab (REMICADE IV) Inject into the vein every 2 months       lisinopril-hydrochlorothiazide (ZESTORETIC) 20-12.5 MG tablet Take 1 tablet by mouth 2 times daily Patient is prescribed to take twice daily but states she doesn't always take 2 doses a day.       mercaptopurine (PURINETHOL) 50 MG tablet Take 50 mg  by mouth At Bedtime        metoprolol succinate ER (TOPROL-XL) 100 MG 24 hr tablet Take 100 mg by mouth daily       polyethylene glycol (MIRALAX/GLYCOLAX) powder Take 1 capful by mouth daily as needed for constipation        rosuvastatin (CRESTOR) 10 MG tablet Take 1 tablet (10 mg) by mouth daily       vitamin D3 (CHOLECALCIFEROL) 50 mcg (2000 units) tablet Take 50 mcg by mouth daily         ALLERGIES     Allergies   Allergen Reactions     Ibuprofen Other (See Comments)     Told not to take due to bleeing     Innovar [Fentanyl-Droperidol] Difficulty breathing     Heavy chest     Sulfa Drugs Itching       PAST MEDICAL HISTORY:  Past Medical History:   Diagnosis Date     Anemia      Ascending aorta dilation (H)     Mild, History of on Echo 2006     Atrial fibrillation with RVR (H) 10/22/2021     Crohn's disease (H)      Crohn's disease of small intestine without complication (H) 04/07/2016     HTN (hypertension)      Hypertension      Iron deficiency anemia due to chronic blood loss 10/02/2015     Diagnosis updated by automated process. Provider to review and confirm.     Malabsorption 07/15/2016     PAF (paroxysmal atrial fibrillation) (H)        PAST SURGICAL HISTORY:  Past Surgical History:   Procedure Laterality Date     GYN SURGERY      hysterectomy       FAMILY HISTORY:  Family History   Problem Relation Age of Onset     Parkinsonism Mother      Hypertension Mother      Hypertension Father      Hypertension Sister      Hyperlipidemia Sister      Unknown/Adopted Daughter        SOCIAL HISTORY:  Social History     Socioeconomic History     Marital status:      Spouse name: None     Number of children: None     Years of education: None     Highest education level: None   Occupational History     None   Tobacco Use     Smoking status: Never Smoker     Smokeless tobacco: Never Used   Substance and Sexual Activity     Alcohol use: Never     Drug use: Never     Sexual activity: None   Other Topics Concern      Parent/sibling w/ CABG, MI or angioplasty before 65F 55M? Not Asked   Social History Narrative     None     Social Determinants of Health     Financial Resource Strain: Not on file   Food Insecurity: Not on file   Transportation Needs: Not on file   Physical Activity: Not on file   Stress: Not on file   Social Connections: Not on file   Intimate Partner Violence: Not on file   Housing Stability: Not on file       Review of Systems:  Skin:  Negative     Eyes:  Negative    ENT:  Negative    Respiratory:  Negative shortness of breath;dyspnea on exertion;cough  Cardiovascular:  chest pain;Negative;edema;dizziness;lightheadedness Positive for;palpitations;fatigue  Gastroenterology: Negative    Genitourinary:  Negative    Musculoskeletal:  Negative    Neurologic:  Negative headaches  Psychiatric:       Heme/Lymph/Imm:  Positive for allergies  Endocrine:  Negative      Physical Exam:  Vitals: /72   Pulse 86   Wt 91 kg (200 lb 9.6 oz)   BMI 33.38 kg/m     Please refer to dictation for physical exam    Recent Lab Results: all reviewed today  CBC RESULTS:  Lab Results   Component Value Date    WBC 10.6 10/22/2021    WBC 8.8 05/25/2021    RBC 4.17 10/22/2021    RBC 4.38 05/25/2021    HGB 12.6 10/22/2021    HGB 13.7 05/25/2021    HCT 41.2 10/22/2021    HCT 42.1 05/25/2021    MCV 99 10/22/2021    MCV 96 05/25/2021    MCH 30.2 10/22/2021    MCH 31.3 05/25/2021    MCHC 30.6 (L) 10/22/2021    MCHC 32.5 05/25/2021    RDW 19.9 (H) 10/22/2021    RDW 17.2 (H) 05/25/2021     (H) 10/22/2021     05/25/2021       BMP RESULTS:  Lab Results   Component Value Date     10/22/2021     05/25/2021    POTASSIUM 3.2 (L) 10/22/2021    POTASSIUM 3.9 05/25/2021    CHLORIDE 105 10/22/2021    CHLORIDE 108 05/25/2021    CO2 23 10/22/2021    CO2 26 05/25/2021    ANIONGAP 11 10/22/2021    ANIONGAP 5 05/25/2021     (H) 10/22/2021     (H) 05/25/2021    BUN 15 10/22/2021    BUN 16 05/25/2021    CR 0.78  10/22/2021    CR 0.77 2021    GFRESTIMATED 77 10/22/2021    GFRESTIMATED 78 2021    GFRESTBLACK >90 2021    BARBER 10.4 (H) 10/22/2021    BARBER 10.0 2021        INR RESULTS:  No results found for: INR        CC  No referring provider defined for this encounter.        Service Date: 2022    PRIMARY CARDIOLOGIST:  Lito Warren MD     REASON FOR VISIT:  Stress test, echo, AFib followup.    HISTORY OF PRESENT ILLNESS:  Ms. Arriola is a delightful, 71-year-old woman with a past medical history significant for the followin.  Crohn disease with iron deficiency anemia related to malabsorption, on IV Injectafer approximately every 2 months when iron levels dip.  2.  Hypertension.  3.  Paroxysmal atrial fibrillation diagnosed in 10/2021 with a single episode.  4.  Abnormal stress test.    Jacklyn came to Dr. Warren' attention, as she had an episode in October where she had GI upset while at [facility] and had both vomiting and diarrhea.  This was after an IV Feraheme injection, which was different than her normal Injectafer.  At that point, she was found to be in AFib with RVR and converted in the ER with diltiazem.  She elected not to start anticoagulation at that time and had some ST depression noted.  She was seen by Dr. Warren and sent for an echo and a stress test.  The echocardiogram was completed, and it was reviewed today.  It showed normal *** without significant valvular disease and normal-sized atria bilaterally.  She also had a normal IVC.  Her stress test showed a small area of mild ischemia in the distal lateral and inferolateral segment.    She comes in today saying she overall feels okay.  She has more palpitations when her iron is low and her energy is poor.  Her last hemoglobin was 11.4, but she suspects her iron levels will be low, and she will be getting an iron infusion in the next week or 2.  She otherwise denies chest pain, shortness of breath, orthopnea or PND.   Her blood pressure is variable and is often high when she initially comes into a clinic visit, and she admits she feels anxious.  It then typically drops in the 130s-120s.  Her home blood pressures typically run in the low 130s/80s.  She denies any chest pain, orthopnea or PND.  She can do all her normal activities, including climbing stairs, without difficulties.    SOCIAL HISTORY:  She is a retired LPN.  She worked for the [employer] for 26 years and retired at the age of 65.  Lifelong nonsmoker.  No alcohol use.    PHYSICAL EXAMINATION:    GENERAL:  Well-developed, well-nourished woman in no acute distress.  HEENT:  Normocephalic, atraumatic.  HEART:  Regular in rate and rhythm.  No murmur, rub or gallop.  LUNGS:  Clear without wheezes, rales or rhonchi.  SKIN:  Warm and dry.    ASSESSMENT AND PLAN:    1.  Paroxysmal atrial fibrillation with a one-time episode in the context of nausea, vomiting and diarrhea.  She has small/normal atria on her echocardiogram, suggesting that she has at least not been in long-term AFib, but we know at the age of 71, she certainly has a risk of AFib.  She has a CHADS-VASc of 3 for age, gender and hypertension, and it is quite possible she will go into AFib again, and our recommendation is to start Eliquis 5 mg b.i.d.  I reviewed the rationale around it, and she would like to speak with her team at [facility] as well as Heme about starting that.  I asked her if and when she is ready to just call us, and we will be happy to send her a prescription and work with her to figure out when that is affordable.    2.  Abnormal stress test with a small inferolateral area of ischemia.  The patient is without anginal symptoms and has very predictable findings of fatigue before getting her iron infusions that resolve after iron infusions.  We discussed following this pattern, and if she develops chest pain or she does not respond like she would expect to from her iron infusions, we may  want to consider more workup.  At this point, we will otherwise do secondary prevention and medical management.  She was asked to start on Crestor 10 by her primary, and I agree with that.  She will start that today.  We will get a lipid panel and ALT today.  We will not start her on aspirin at this point, as hopefully we will get her on Eliquis instead down the road.  3.  Hypertension, elevated on admission, improved and normal here.  We will continue current medications.  4.  Dyslipidemia, goal LDL less than 100.  No recent lipid panel.  We will complete that today.  5.  Chronic anemia secondary to Crohn.  We will appreciate GI and Heme input.    Thank you for allowing me to participate in this delightful patient's care.    Jaylene Watson PA-C        D: 2022   T: 2022   MT: ronald    Name:     COY MOREIRA  MRN:      [id number]        Account:      [id number]   :      1950           Service Date: 2022       Document: [id number]      Thank you for allowing me to participate in the care of your patient.      Sincerely,     Jaylene Watson PA-C     [facility]  cc:   No referring provider defined for this encounter.

## 2022-01-17 NOTE — PATIENT INSTRUCTIONS
Thank you for visiting with me today.    We discussed: echocardiogram is completely normal.    Your stress test has a small area on the bottom side of the heart that may not be getting enough blood.  We'll manage this medically.      Medication changes:  Please start taking the rosuvastatin.    Talk to your GI and hematology team about starting Eliquis.  Call us if you decide to start it.      Follow up: we'll get a cholesterol panel today and liver test as well.  Please follow up with me in about 6 months.        Please call my nurse, Teresa, at [phone] with any questions or concerns.    Scheduling phone number: [phone]  Reminder: Please bring in all current medications, over the counter supplements and vitamin bottles to your next appointment.

## 2022-01-17 NOTE — PROGRESS NOTES
Service Date: 2022    PRIMARY CARDIOLOGIST:  Lito Warren MD     REASON FOR VISIT:  Stress test, echo, AFib followup.    HISTORY OF PRESENT ILLNESS:  Ms. Arriola is a delightful, 71-year-old woman with a past medical history significant for the followin.  Crohn disease with iron deficiency anemia related to malabsorption, on IV Injectafer approximately every 2 months when iron levels dip.  2.  Hypertension.  3.  Paroxysmal atrial fibrillation diagnosed in 10/2021 with a single episode.  4.  Abnormal stress test.    Jacklyn came to Dr. Warren' attention, as she had an episode in October where she had GI upset while at [facility] and had both vomiting and diarrhea.  This was after an IV Feraheme injection, which was different than her normal Injectafer.  At that point, she was found to be in AFib with RVR and converted in the ER with diltiazem.  She elected not to start anticoagulation at that time and had some ST depression noted.  She was seen by Dr. Warren and sent for an echo and a stress test.  The echocardiogram was completed, and it was reviewed today.  It showed normal EF without significant valvular disease and normal-sized atria bilaterally.  She also had a normal IVC.  Her stress test showed a small area of mild ischemia in the distal lateral and inferolateral segment.    She comes in today saying she overall feels okay.  She has more palpitations when her iron is low and her energy is poor.  Her last hemoglobin was 11.4, but she suspects her iron levels will be low, and she will be getting an iron infusion in the next week or 2.  She otherwise denies chest pain, shortness of breath, orthopnea or PND.  Her blood pressure is variable and is often high when she initially comes into a clinic visit, and she admits she feels anxious.  It then typically drops in the 130s-120s.  Her home blood pressures typically run in the low 130s/80s.  She denies any chest pain, orthopnea or PND.  She can do  all her normal activities, including climbing stairs, without difficulties.    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  Lifelong nonsmoker.  No alcohol use.    PHYSICAL EXAMINATION:    GENERAL:  Well-developed, well-nourished woman in no acute distress.  HEENT:  Normocephalic, atraumatic.  HEART:  Regular in rate and rhythm.  No murmur, rub or gallop.  LUNGS:  Clear without wheezes, rales or rhonchi.  SKIN:  Warm and dry.    ASSESSMENT AND PLAN:    1.  Paroxysmal atrial fibrillation with a one-time episode in the context of nausea, vomiting and diarrhea.  She has small/normal atria on her echocardiogram, suggesting that she has at least not been in long-term AFib, but we know at the age of 71, she certainly has a risk of AFib.  She has a CHADS-VASc of 3 for age, gender and hypertension, and it is quite possible she will go into AFib again, and our recommendation is to start Eliquis 5 mg b.i.d.  I reviewed the rationale around it, and she would like to speak with her team at [facility] as well as Heme about starting that.  I asked her if and when she is ready to just call us, and we will be happy to send her a prescription and work with her to figure out when that is affordable.    2.  Abnormal stress test with a small inferolateral area of ischemia.  The patient is without anginal symptoms and has very predictable findings of fatigue before getting her iron infusions that resolve after iron infusions.  We discussed following this pattern, and if she develops chest pain or she does not respond like she would expect to from her iron infusions, we may want to consider more workup.  At this point, we will otherwise do secondary prevention and medical management.  She was asked to start on Crestor 10 by her primary, and I agree with that.  She will start that today.  We will get a lipid panel and ALT today.  We will not start her on  aspirin at this point, as hopefully we will get her on Eliquis instead down the road.  3.  Hypertension, elevated on admission, improved and normal here.  We will continue current medications.  4.  Dyslipidemia, goal LDL less than 100.  No recent lipid panel.  We will complete that today.  5.  Chronic anemia secondary to Crohn.  We will appreciate GI and Heme input.    Thank you for allowing me to participate in this delightful patient's care.    Jaylene Watson PA-C        D: 2022   T: 2022   MT: ronald    Name:     COY MOREIRA  MRN:      [id number]        Account:      [id number]   :      1950           Service Date: 2022       Document: [id number]

## 2022-04-17 ENCOUNTER — HEALTH MAINTENANCE LETTER (OUTPATIENT)
Age: 72
End: 2022-04-17

## 2022-05-04 NOTE — ED NOTES
Called, LM for pt that pt is overdue for a 6 month f/up w/ Dr. Vu Quarles. Waiting for pt to call back to schedule. Patient returned from MRI. Vitals are stable. Patient calm and resting in bed. Report to be given to next RN.

## 2022-05-20 ENCOUNTER — TRANSFERRED RECORDS (OUTPATIENT)
Dept: HEALTH INFORMATION MANAGEMENT | Facility: CLINIC | Age: 72
End: 2022-05-20
Payer: MEDICARE

## 2022-05-23 ENCOUNTER — CARE COORDINATION (OUTPATIENT)
Dept: CARDIOLOGY | Facility: CLINIC | Age: 72
End: 2022-05-23
Payer: MEDICARE

## 2022-05-23 DIAGNOSIS — I48.0 PAROXYSMAL ATRIAL FIBRILLATION (H): Primary | ICD-10-CM

## 2022-05-23 NOTE — PROGRESS NOTES
5/23/2022 Call from Emely with [facility]. Patient recently seen by Dr Patrick Dreyer [phone] and he would like a call from Jaylene Watson PA-C to discuss Elquis as outlined in her office note 1/2022.  Messaged to Jaylene to contact DR Dreyer to discuss.  Teresa Allen RN 05/23/22 11:50 AM

## 2022-05-24 NOTE — PROGRESS NOTES
5/24/2022 Called to patient with Jaylene Watson PA-C plan post speaking with Hematology. Patient reports agreement to monitor. Order is not in at this time. Patient asks for clarification of timing of monitor around this infusion. Jacklyn reports in middle of two step infusion, first infusion was last Friday and second step infusion 7 days later is this Friday 5/27. After that Patient will have next set of labs with Hematology 6/24/22.     Jacklyn also states is wary of being on a blood thinner for any length of time with her GI history.     Will clarify type of monitor and timing of monitor with Jaylene and call patient back.  Patient reports may leave a message on her home phone.  Teresa Allen RN 05/24/22 4:50 PM

## 2022-05-24 NOTE — PROGRESS NOTES
Called Dr. Dreyer, left  with my cell number to call back.  Jaylene Watson PA-C 5/24/2022 11:52 AM

## 2022-05-24 NOTE — PROGRESS NOTES
I spoke with Dr. Dreyer, he noted pt with hx of multiple GI bleeds with neg upper and lower GI studies.  She's anemic and now requiring IV iron q6 weeks.  She develops palpitations prior to IV iron.  He's asking for help with risk stratification and necessity of anticoagulation.  We discussed getting a 1 month even monitor to assess afib burden.  I'd like this to be before her next iron transfusion so we can catch her palpitations.  Depending on the amount of afib, we would then have her see EP to discuss possible watchman device noting that watchman requires anticoagulation for the 6 months after the procedure.      Event monitor order placed.  Pls reach out to the patient and explain the plan.  We can await monitor and then determine if she should see Dr. Warren, myself or EP in f/u.      Jaylene Watson PA-C 5/24/2022 1:24 PM

## 2022-05-25 NOTE — PROGRESS NOTES
I apologize for not placing order.  I'd like a 30 day event monitor please.  If possible let's place the monitor in the first or second week of June to correspond to when she gets palps as iron is getting low.

## 2022-05-25 NOTE — PROGRESS NOTES
Called to Patient - left message to call scheduling to schedule 30 day  event monitor 1st or 2nd week in June. Messaged to nurse board to check if scheduled.  Teresa Allen RN 05/25/22 9:55 AM

## 2022-06-06 ENCOUNTER — HOSPITAL ENCOUNTER (OUTPATIENT)
Dept: CARDIOLOGY | Facility: CLINIC | Age: 72
Discharge: HOME | End: 2022-06-06
Attending: PHYSICIAN ASSISTANT | Admitting: PHYSICIAN ASSISTANT
Payer: MEDICARE

## 2022-06-06 DIAGNOSIS — I48.0 PAROXYSMAL ATRIAL FIBRILLATION (H): ICD-10-CM

## 2022-06-06 PROCEDURE — 93270 REMOTE 30 DAY ECG REV/REPORT: CPT

## 2022-06-06 PROCEDURE — 93272 ECG/REVIEW INTERPRET ONLY: CPT | Performed by: INTERNAL MEDICINE

## 2022-06-12 ENCOUNTER — HEALTH MAINTENANCE LETTER (OUTPATIENT)
Age: 72
End: 2022-06-12

## 2022-07-11 ENCOUNTER — TELEPHONE (OUTPATIENT)
Dept: CARDIOLOGY | Facility: CLINIC | Age: 72
End: 2022-07-11

## 2022-07-11 NOTE — TELEPHONE ENCOUNTER
Received final result report of the [employer] heart rhythm monitor that Jacklyn wore from 6\9 - 7\8\22.  Report shows atrial fibrillation.    Placed for Dr. Warren to review and sign.    Karie Root RN on 7/11/2022 at 3:04 PM

## 2022-07-13 NOTE — PROGRESS NOTES
7/13/22 Message d to Jaylene Watson PA-C that event monitor is available for review with Dr Warren nurse team. Messaged to nurse team to place copy of Event monitor on Jaylene's desk. No follow up scheduled. Jaylene may consider EP consult based on results.  Teresa Allen RN 07/13/22 9:40 AM    No future appointments.

## 2022-07-13 NOTE — PROGRESS NOTES
Event monitor reviewed.  During symptomatic events pt had sinus with PAC or PVC, all of which are benign.  The event monitor does not show dani afib between 6/9 and 7/5 although we can't guarantee she wont' have afib at a different time.   Based on this I'd be less likely to push for anticoagulation, but the risk of stroke remains similar.  What is reassuring is that when she has palpitations it's not necessarily afib.  At this point given her anemia requiring IV iron q 6 weeks lets hold on anticoagulation.  If she has a change in character of her palps we can reconsider.  Jaylene Watson PA-C 7/13/2022 4:47 PM  ================    7/13/22 Called to Patient and reviewed Jaylene Watson PA-C comments and recommendations. Patient is due for 6 month visit with will call scheduling to arrange. Patient agrees to call as needed for concerns or changes.  Teresa Allen RN 07/13/22 5:15 PM

## 2022-07-13 NOTE — PROGRESS NOTES
Event monitor reviewed.  During symptomatic events pt had sinus with PAC or PVC, all of which are benign.  The event monitor does not show dani afib between 6/9 and 7/5 although we can't guarantee she wont' have afib at a different time.   Based on this I'd be less likely to push for anticoagulation, but the risk of stroke remains similar.  What is reassuring is that when she has palpitations it's not necessarily afib.  At this point given her anemia requiring IV iron q 6 weeks lets hold on anticoagulation.  If she has a change in character of her palps we can reconsider.  Jaylene Watson PA-C 7/13/2022 4:47 PM

## 2022-08-19 ENCOUNTER — TRANSFERRED RECORDS (OUTPATIENT)
Dept: HEALTH INFORMATION MANAGEMENT | Facility: CLINIC | Age: 72
End: 2022-08-19

## 2022-10-10 ENCOUNTER — OFFICE VISIT (OUTPATIENT)
Dept: CARDIOLOGY | Facility: CLINIC | Age: 72
End: 2022-10-10
Attending: PHYSICIAN ASSISTANT
Payer: MEDICARE

## 2022-10-10 VITALS
DIASTOLIC BLOOD PRESSURE: 82 MMHG | HEIGHT: 65 IN | SYSTOLIC BLOOD PRESSURE: 124 MMHG | WEIGHT: 204 LBS | HEART RATE: 80 BPM | BODY MASS INDEX: 33.99 KG/M2

## 2022-10-10 DIAGNOSIS — I10 HYPERTENSION, UNSPECIFIED TYPE: Primary | ICD-10-CM

## 2022-10-10 DIAGNOSIS — E78.5 DYSLIPIDEMIA, GOAL LDL BELOW 70: ICD-10-CM

## 2022-10-10 DIAGNOSIS — I48.0 PAROXYSMAL ATRIAL FIBRILLATION (H): ICD-10-CM

## 2022-10-10 PROCEDURE — 99214 OFFICE O/P EST MOD 30 MIN: CPT | Performed by: PHYSICIAN ASSISTANT

## 2022-10-10 RX ORDER — LISINOPRIL AND HYDROCHLOROTHIAZIDE 12.5; 2 MG/1; MG/1
1 TABLET ORAL 2 TIMES DAILY
Qty: 180 TABLET | Refills: 3 | Status: SHIPPED | OUTPATIENT
Start: 2022-10-10 | End: 2023-10-24

## 2022-10-10 RX ORDER — PRAVASTATIN SODIUM 10 MG
5 TABLET ORAL DAILY
Qty: 15 TABLET | Refills: 11 | Status: SHIPPED | OUTPATIENT
Start: 2022-10-10 | End: 2022-10-27

## 2022-10-10 NOTE — PATIENT INSTRUCTIONS
Thank you for visiting with me today.    Happy early birthday!!!    We discussed: From a heart standpoint you're doing great!    If you notice more palpitations.      Medication changes:  start taking pravachol 5 mg at night.  If you're tolerating ok, we'll repeat your cholesterol and liver function in 6 weeks.      Follow up: in 1 year with Dr. Warren with an echocardiogram in 1 year.    Please repeat your cholesterol panel and liver function in about 6 weeks at Dr. Engel's office.         Please call my nurse, Teresa, at [phone] with any questions or concerns.    Scheduling phone number: [phone]  Reminder: Please bring in all current medications, over the counter supplements and vitamin bottles to your next appointment.

## 2022-10-10 NOTE — PROGRESS NOTES
Service Date: 10/10/2022    PRIMARY CARDIOLOGIST:  Dr. Warren.    REASON FOR VISIT:  AFib, abnormal stress test followup.    HISTORY OF PRESENT ILLNESS:  Ms. Arriola is a delightful 71-year-old woman with a past medical history significant for the followin.  Crohn disease with iron deficiency anemia related to malabsorption, on IV Injectafer approximately every 6 months, followed by [facility].  2.  Hypertension.  3.  Paroxysmal atrial fibrillation diagnosed in 10/2021 in the context of GI upset, vomiting and diarrhea after IV Feraheme injection instead of her Injectafer.  She converted with diltiazem.  4.  Abnormal stress test with nuclear stress test in 2022 showing a small area of mild ischemia in the distal lateral and inferolateral segments, managed medically.  5.  Normal echocardiogram.    I am seeing Jacklyn today in routine followup.  She overall has felt well.  She had a monitor placed this summer that was somewhat of a mess in that it did not work when she left the building.  Then it did not work after they set it up with tech support, and she ended up wearing it about for 40 days.  Fortunately, it showed just PACs and PVCs.  She did not have any atrial fibrillation on that, nor SVT.  Today she says she has not noted any palpitations, syncope or presyncope.  Her blood pressure is variable and she has known white-coat hypertension.  She tried taking Crestor last year and this made her feel jittery as if she was taking prednisone.  She continues to get her IV iron and that seems to be helpful.  They changed the scheduling to more frequently.  She otherwise denies cardiac concerns.    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  Lifelong nonsmoker.  No alcohol use.    PHYSICAL EXAMINATION:    GENERAL:  Well-developed, well-nourished woman in no acute distress.  HEENT:  Normocephalic, atraumatic.  HEART:  Regular rate and rhythm  without murmur, rub or gallop.  LUNGS:  Clear, without wheezes, rales or rhonchi.  NECK:  Carotids are quiet.  EXTREMITIES:  Without peripheral edema.    ASSESSMENT AND PLAN:    1.  One-time episode of paroxysmal atrial fibrillation in the context of nausea and vomiting and diarrhea, with normal atria on echocardiogram and 1-month event monitor showing no AFib.  Last year, I coordinated with both her GI doctor and hematologist, and they prefer that she not be on anticoagulation given her ongoing anemia.  This was again mentioned in her last note by Dr. Dreyer, and given that she has had no recurrence of her AFib, we will keep her off Eliquis at this time.  She has a CHADS-VASc of 3.  2.  Abnormal stress test, small inferolateral ischemia without anginal symptoms, followed conservatively.  She is not on aspirin for the reasons listed above.  At this point, we again discussed statins and she was unable to tolerate Crestor that was started by her primary.  We will try her on Pravachol 5 mg at bedtime, repeat lipid panel and ALT in about 6 weeks.  She preferred to get this done at her primary care provider.  3.  Hypertension, white coat, improved after a brief rest here.  4.  Comorbidities include Crohn's disease and chronic iron deficiency anemia.    This patient is overall doing well.  I would like to get her LDL less than 70 if possible, at least below 100.  Her last lipid panel, which I reviewed today, was done at her primary in August and showed an , total cholesterol 185, HDL 38.    She will follow up with Dr. Warren in 1 year.  We will work on her cholesterol in the interim.  She will get an echocardiogram before that visit and labs at her primary.  She will otherwise call sooner with concerns.    Thank you for allowing me to participate in this delightful patient's care.    Jaylene Watson PA-C        D: 10/10/2022   T: 10/10/2022   MT: sheldon    Name:     COY MOREIRA  MRN:      [id number]         Account:      [id number]   :      1950           Service Date: 10/10/2022       Document: [id number]

## 2022-10-10 NOTE — LETTER
10/10/2022    [facility]  [address]  [place] [address]    RE: Jacklyn Arriola       Dear Colleague,     I had the pleasure of seeing Jacklyn Arriola in the [facility].  [id number]      HPI and Plan:   See dictation    Orders this Visit:  Orders Placed This Encounter   Procedures     Lipid Profile     ALT     Follow-Up with Cardiology     Echocardiogram Complete     Orders Placed This Encounter   Medications     pravastatin (PRAVACHOL) 10 MG tablet     Sig: Take 0.5 tablets (5 mg) by mouth daily     Dispense:  15 tablet     Refill:  11     lisinopril-hydrochlorothiazide (ZESTORETIC) 20-12.5 MG tablet     Sig: Take 1 tablet by mouth 2 times daily     Dispense:  180 tablet     Refill:  3     Medications Discontinued During This Encounter   Medication Reason     rosuvastatin (CRESTOR) 10 MG tablet Stopped by Patient     lisinopril-hydrochlorothiazide (ZESTORETIC) 20-12.5 MG tablet          Encounter Diagnoses   Name Primary?     Dyslipidemia, goal LDL below 70      Hypertension, unspecified type Yes     Paroxysmal atrial fibrillation (H)        CURRENT MEDICATIONS:  Current Outpatient Medications   Medication Sig Dispense Refill     butalbital-acetaminophen-caffeine (ESGIC) -40 MG tablet Take 1 tablet by mouth every 4 hours as needed 3 tablet 0     cyanocobalamin (CYANOCOBALAMIN) 1000 MCG/ML injection Inject 1 mL into the muscle every 30 days       Ferric Carboxymaltose (INJECTAFER IV)        hypromellose (ARTIFICIAL TEARS) 0.5 % SOLN ophthalmic solution Place 1 drop into both eyes 2 times daily       inFLIXimab (REMICADE IV) Inject into the vein every 2 months       lisinopril-hydrochlorothiazide (ZESTORETIC) 20-12.5 MG tablet Take 1 tablet by mouth 2 times daily 180 tablet 3     mercaptopurine (PURINETHOL) 50 MG tablet Take 50 mg by mouth At Bedtime        metoprolol succinate ER (TOPROL-XL) 100 MG 24 hr tablet Take 100 mg by mouth daily       polyethylene  glycol (MIRALAX/GLYCOLAX) powder Take 1 capful by mouth daily as needed for constipation        pravastatin (PRAVACHOL) 10 MG tablet Take 0.5 tablets (5 mg) by mouth daily 15 tablet 11     vitamin D3 (CHOLECALCIFEROL) 50 mcg (2000 units) tablet Take 50 mcg by mouth daily         ALLERGIES     Allergies   Allergen Reactions     Ibuprofen Other (See Comments)     Told not to take due to bleeing     Innovar [Fentanyl-Droperidol] Difficulty breathing     Heavy chest     Sulfa Drugs Itching     Crestor [Rosuvastatin] Other (See Comments)     Irritability         PAST MEDICAL HISTORY:  Past Medical History:   Diagnosis Date     Anemia      Ascending aorta dilation (H)     Mild, History of on Echo 2006     Atrial fibrillation with RVR (H) 10/22/2021     Crohn's disease (H)      Crohn's disease of small intestine without complication (H) 04/07/2016     HTN (hypertension)      Hypertension      Iron deficiency anemia due to chronic blood loss 10/02/2015     Diagnosis updated by automated process. Provider to review and confirm.     Malabsorption 07/15/2016     PAF (paroxysmal atrial fibrillation) (H)        PAST SURGICAL HISTORY:  Past Surgical History:   Procedure Laterality Date     GYN SURGERY      hysterectomy       FAMILY HISTORY:  Family History   Problem Relation Age of Onset     Parkinsonism Mother      Hypertension Mother      Hypertension Father      Hypertension Sister      Hyperlipidemia Sister      Unknown/Adopted Daughter        SOCIAL HISTORY:  Social History     Socioeconomic History     Marital status:      Spouse name: None     Number of children: None     Years of education: None     Highest education level: None   Tobacco Use     Smoking status: Never     Smokeless tobacco: Never   Substance and Sexual Activity     Alcohol use: Never     Drug use: Never       Review of Systems:  Skin:  not assessed     Eyes:  not assessed    ENT:  not assessed    Respiratory:  Negative    Cardiovascular:     "Positive for;palpitations  Gastroenterology: not assessed    Genitourinary:  not assessed    Musculoskeletal:  not assessed    Neurologic:  not assessed    Psychiatric:  not assessed    Heme/Lymph/Imm:  Positive for allergies  Endocrine:  Positive for thyroid disorder    Physical Exam:  Vitals: /82   Pulse 80   Ht 1.651 m (5' 5\")   Wt 92.5 kg (204 lb)   BMI 33.95 kg/m     Please refer to dictation for physical exam    Recent Lab Results: all reviewed today  CBC RESULTS:  Lab Results   Component Value Date    WBC 10.6 10/22/2021    WBC 8.8 2021    RBC 4.17 10/22/2021    RBC 4.38 2021    HGB 12.6 10/22/2021    HGB 13.7 2021    HCT 41.2 10/22/2021    HCT 42.1 2021    MCV 99 10/22/2021    MCV 96 2021    MCH 30.2 10/22/2021    MCH 31.3 2021    MCHC 30.6 (L) 10/22/2021    MCHC 32.5 2021    RDW 19.9 (H) 10/22/2021    RDW 17.2 (H) 2021     (H) 10/22/2021     2021       BMP RESULTS:  Lab Results   Component Value Date     10/22/2021     2021    POTASSIUM 3.2 (L) 10/22/2021    POTASSIUM 3.9 2021    CHLORIDE 105 10/22/2021    CHLORIDE 108 2021    CO2 23 10/22/2021    CO2 26 2021    ANIONGAP 11 10/22/2021    ANIONGAP 5 2021     (H) 10/22/2021     (H) 2021    BUN 15 10/22/2021    BUN 16 2021    CR 0.78 10/22/2021    CR 0.77 2021    GFRESTIMATED 77 10/22/2021    GFRESTIMATED 78 2021    GFRESTBLACK >90 2021    BARBER 10.4 (H) 10/22/2021    BARBER 10.0 2021        INR RESULTS:  No results found for: INR        CC  Jaylene Watson, PAVasquezC  [address]  [place] [id number]        Service Date: 10/10/2022    PRIMARY CARDIOLOGIST:  Dr. Warren.    REASON FOR VISIT:  AFib, abnormal stress test followup.    HISTORY OF PRESENT ILLNESS:  Ms. Arriola is a delightful 71-year-old woman with a past medical history significant for the followin.  Crohn disease with " iron deficiency anemia related to malabsorption, on IV Injectafer approximately every 6 months, followed by [facility].  2.  Hypertension.  3.  Paroxysmal atrial fibrillation diagnosed in 10/2021 in the context of GI upset, vomiting and diarrhea after IV Feraheme injection instead of her Injectafer.  She converted with diltiazem.  4.  Abnormal stress test with nuclear stress test in 01/2022 showing a small area of mild ischemia in the distal lateral and inferolateral segments, managed medically.  5.  Normal echocardiogram.    I am seeing Jacklyn today in routine followup.  She overall has felt well.  She had a monitor placed this summer that was somewhat of a mess in that it did not work when she left the building.  Then it did not work after they set it up with tech support, and she ended up wearing it about for 40 days.  Fortunately, it showed just PACs and PVCs.  She did not have any atrial fibrillation on that, nor SVT.  Today she says she has not noted any palpitations, syncope or presyncope.  Her blood pressure is variable and she has known white-coat hypertension.  She tried taking Crestor last year and this made her feel jittery as if she was taking prednisone.  She continues to get her IV iron and that seems to be helpful.  They changed the scheduling to more frequently.  She otherwise denies cardiac concerns.    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  Lifelong nonsmoker.  No alcohol use.    PHYSICAL EXAMINATION:    GENERAL:  Well-developed, well-nourished woman in no acute distress.  HEENT:  Normocephalic, atraumatic.  HEART:  Regular rate and rhythm without murmur, rub or gallop.  LUNGS:  Clear, without wheezes, rales or rhonchi.  NECK:  Carotids are quiet.  EXTREMITIES:  Without peripheral edema.    ASSESSMENT AND PLAN:    1.  One-time episode of paroxysmal atrial fibrillation in the context of nausea and vomiting and  diarrhea, with normal atria on echocardiogram and 1-month event monitor showing no AFib.  Last year, I coordinated with both her GI doctor and hematologist, and they prefer that she not be on anticoagulation given her ongoing anemia.  This was again mentioned in her last note by Dr. Dreyer, and given that she has had no recurrence of her AFib, we will keep her off Eliquis at this time.  She has a CHADS-VASc of 3.  2.  Abnormal stress test, small inferolateral ischemia without anginal symptoms, followed conservatively.  She is not on aspirin for the reasons listed above.  At this point, we again discussed statins and she was unable to tolerate Crestor that was started by her primary.  We will try her on Pravachol 5 mg at bedtime, repeat lipid panel and ALT in about 6 weeks.  She preferred to get this done at her primary care provider.  3.  Hypertension, white coat, improved after a brief rest here.  4.  Comorbidities include Crohn's disease and chronic iron deficiency anemia.    This patient is overall doing well.  I would like to get her LDL less than 70 if possible, at least below 100.  Her last lipid panel, which I reviewed today, was done at her primary in August and showed an , total cholesterol 185, HDL 38.    She will follow up with Dr. Warren in 1 year.  We will work on her cholesterol in the interim.  She will get an echocardiogram before that visit and labs at her primary.  She will otherwise call sooner with concerns.    Thank you for allowing me to participate in this delightful patient's care.    Jaylene Watson PA-C        D: 10/10/2022   T: 10/10/2022   MT:     Name:     COY MOREIRA  MRN:      [id number]        Account:      [id number]   :      1950           Service Date: 10/10/2022       Document: [id number]      Thank you for allowing me to participate in the care of your patient.      Sincerely,     Jaylene Watson PA-C     [facility]  [facility]  cc:   Jaylene Watson PA-C  [address]  [place] [id number]

## 2022-10-10 NOTE — PROGRESS NOTES
[id number]      HPI and Plan:   See dictation    Orders this Visit:  Orders Placed This Encounter   Procedures     Lipid Profile     ALT     Follow-Up with Cardiology     Echocardiogram Complete     Orders Placed This Encounter   Medications     pravastatin (PRAVACHOL) 10 MG tablet     Sig: Take 0.5 tablets (5 mg) by mouth daily     Dispense:  15 tablet     Refill:  11     lisinopril-hydrochlorothiazide (ZESTORETIC) 20-12.5 MG tablet     Sig: Take 1 tablet by mouth 2 times daily     Dispense:  180 tablet     Refill:  3     Medications Discontinued During This Encounter   Medication Reason     rosuvastatin (CRESTOR) 10 MG tablet Stopped by Patient     lisinopril-hydrochlorothiazide (ZESTORETIC) 20-12.5 MG tablet          Encounter Diagnoses   Name Primary?     Dyslipidemia, goal LDL below 70      Hypertension, unspecified type Yes     Paroxysmal atrial fibrillation (H)        CURRENT MEDICATIONS:  Current Outpatient Medications   Medication Sig Dispense Refill     butalbital-acetaminophen-caffeine (ESGIC) -40 MG tablet Take 1 tablet by mouth every 4 hours as needed 3 tablet 0     cyanocobalamin (CYANOCOBALAMIN) 1000 MCG/ML injection Inject 1 mL into the muscle every 30 days       Ferric Carboxymaltose (INJECTAFER IV)        hypromellose (ARTIFICIAL TEARS) 0.5 % SOLN ophthalmic solution Place 1 drop into both eyes 2 times daily       inFLIXimab (REMICADE IV) Inject into the vein every 2 months       lisinopril-hydrochlorothiazide (ZESTORETIC) 20-12.5 MG tablet Take 1 tablet by mouth 2 times daily 180 tablet 3     mercaptopurine (PURINETHOL) 50 MG tablet Take 50 mg by mouth At Bedtime        metoprolol succinate ER (TOPROL-XL) 100 MG 24 hr tablet Take 100 mg by mouth daily       polyethylene glycol (MIRALAX/GLYCOLAX) powder Take 1 capful by mouth daily as needed for constipation        pravastatin (PRAVACHOL) 10 MG tablet Take 0.5 tablets (5 mg) by mouth daily 15 tablet 11     vitamin D3 (CHOLECALCIFEROL) 50 mcg  (2000 units) tablet Take 50 mcg by mouth daily         ALLERGIES     Allergies   Allergen Reactions     Ibuprofen Other (See Comments)     Told not to take due to bleeing     Innovar [Fentanyl-Droperidol] Difficulty breathing     Heavy chest     Sulfa Drugs Itching     Crestor [Rosuvastatin] Other (See Comments)     Irritability         PAST MEDICAL HISTORY:  Past Medical History:   Diagnosis Date     Anemia      Ascending aorta dilation (H)     Mild, History of on Echo 2006     Atrial fibrillation with RVR (H) 10/22/2021     Crohn's disease (H)      Crohn's disease of small intestine without complication (H) 04/07/2016     HTN (hypertension)      Hypertension      Iron deficiency anemia due to chronic blood loss 10/02/2015     Diagnosis updated by automated process. Provider to review and confirm.     Malabsorption 07/15/2016     PAF (paroxysmal atrial fibrillation) (H)        PAST SURGICAL HISTORY:  Past Surgical History:   Procedure Laterality Date     GYN SURGERY      hysterectomy       FAMILY HISTORY:  Family History   Problem Relation Age of Onset     Parkinsonism Mother      Hypertension Mother      Hypertension Father      Hypertension Sister      Hyperlipidemia Sister      Unknown/Adopted Daughter        SOCIAL HISTORY:  Social History     Socioeconomic History     Marital status:      Spouse name: None     Number of children: None     Years of education: None     Highest education level: None   Tobacco Use     Smoking status: Never     Smokeless tobacco: Never   Substance and Sexual Activity     Alcohol use: Never     Drug use: Never       Review of Systems:  Skin:  not assessed     Eyes:  not assessed    ENT:  not assessed    Respiratory:  Negative    Cardiovascular:    Positive for;palpitations  Gastroenterology: not assessed    Genitourinary:  not assessed    Musculoskeletal:  not assessed    Neurologic:  not assessed    Psychiatric:  not assessed    Heme/Lymph/Imm:  Positive for  "allergies  Endocrine:  Positive for thyroid disorder    Physical Exam:  Vitals: /82   Pulse 80   Ht 1.651 m (5' 5\")   Wt 92.5 kg (204 lb)   BMI 33.95 kg/m     Please refer to dictation for physical exam    Recent Lab Results: all reviewed today  CBC RESULTS:  Lab Results   Component Value Date    WBC 10.6 10/22/2021    WBC 8.8 05/25/2021    RBC 4.17 10/22/2021    RBC 4.38 05/25/2021    HGB 12.6 10/22/2021    HGB 13.7 05/25/2021    HCT 41.2 10/22/2021    HCT 42.1 05/25/2021    MCV 99 10/22/2021    MCV 96 05/25/2021    MCH 30.2 10/22/2021    MCH 31.3 05/25/2021    MCHC 30.6 (L) 10/22/2021    MCHC 32.5 05/25/2021    RDW 19.9 (H) 10/22/2021    RDW 17.2 (H) 05/25/2021     (H) 10/22/2021     05/25/2021       BMP RESULTS:  Lab Results   Component Value Date     10/22/2021     05/25/2021    POTASSIUM 3.2 (L) 10/22/2021    POTASSIUM 3.9 05/25/2021    CHLORIDE 105 10/22/2021    CHLORIDE 108 05/25/2021    CO2 23 10/22/2021    CO2 26 05/25/2021    ANIONGAP 11 10/22/2021    ANIONGAP 5 05/25/2021     (H) 10/22/2021     (H) 05/25/2021    BUN 15 10/22/2021    BUN 16 05/25/2021    CR 0.78 10/22/2021    CR 0.77 05/25/2021    GFRESTIMATED 77 10/22/2021    GFRESTIMATED 78 05/25/2021    GFRESTBLACK >90 05/25/2021    BARBER 10.4 (H) 10/22/2021    BARBER 10.0 05/25/2021        INR RESULTS:  No results found for: INR        CC  Jaylene Watson PAVasquezC  [address]  [place] [id number]      " Yes

## 2022-10-23 ENCOUNTER — HEALTH MAINTENANCE LETTER (OUTPATIENT)
Age: 72
End: 2022-10-23

## 2022-11-25 ENCOUNTER — TRANSFERRED RECORDS (OUTPATIENT)
Dept: HEALTH INFORMATION MANAGEMENT | Facility: CLINIC | Age: 72
End: 2022-11-25

## 2023-06-18 ENCOUNTER — HEALTH MAINTENANCE LETTER (OUTPATIENT)
Age: 73
End: 2023-06-18

## 2023-10-24 DIAGNOSIS — I10 HYPERTENSION, UNSPECIFIED TYPE: ICD-10-CM

## 2023-10-24 RX ORDER — LISINOPRIL AND HYDROCHLOROTHIAZIDE 12.5; 2 MG/1; MG/1
1 TABLET ORAL 2 TIMES DAILY
Qty: 180 TABLET | Refills: 0 | Status: SHIPPED | OUTPATIENT
Start: 2023-10-24

## 2024-06-02 ENCOUNTER — HEALTH MAINTENANCE LETTER (OUTPATIENT)
Age: 74
End: 2024-06-02

## 2024-08-11 ENCOUNTER — HEALTH MAINTENANCE LETTER (OUTPATIENT)
Age: 74
End: 2024-08-11

## 2025-08-17 ENCOUNTER — HEALTH MAINTENANCE LETTER (OUTPATIENT)
Age: 75
End: 2025-08-17

## (undated) RX ORDER — REGADENOSON 0.08 MG/ML
INJECTION, SOLUTION INTRAVENOUS
Status: DISPENSED
Start: 2022-01-03